# Patient Record
Sex: FEMALE | Race: WHITE | URBAN - METROPOLITAN AREA
[De-identification: names, ages, dates, MRNs, and addresses within clinical notes are randomized per-mention and may not be internally consistent; named-entity substitution may affect disease eponyms.]

---

## 2019-10-21 ENCOUNTER — INPATIENT (INPATIENT)
Facility: HOSPITAL | Age: 84
LOS: 1 days | Discharge: HOME | DRG: 394 | End: 2019-10-23
Attending: HOSPITALIST | Admitting: HOSPITALIST
Payer: MEDICARE

## 2019-10-21 VITALS — WEIGHT: 149.91 LBS | HEIGHT: 60 IN

## 2019-10-21 LAB
ALBUMIN SERPL ELPH-MCNC: 3.4 G/DL — SIGNIFICANT CHANGE UP (ref 3.3–5)
ALP SERPL-CCNC: 101 U/L — SIGNIFICANT CHANGE UP (ref 40–120)
ALT FLD-CCNC: 14 U/L — SIGNIFICANT CHANGE UP (ref 12–78)
ANION GAP SERPL CALC-SCNC: 10 MMOL/L — SIGNIFICANT CHANGE UP (ref 5–17)
APPEARANCE UR: CLEAR — SIGNIFICANT CHANGE UP
APTT BLD: 31.2 SEC — SIGNIFICANT CHANGE UP (ref 27.5–36.3)
AST SERPL-CCNC: 27 U/L — SIGNIFICANT CHANGE UP (ref 15–37)
BASOPHILS # BLD AUTO: 0.02 K/UL — SIGNIFICANT CHANGE UP (ref 0–0.2)
BASOPHILS NFR BLD AUTO: 0.4 % — SIGNIFICANT CHANGE UP (ref 0–2)
BILIRUB SERPL-MCNC: 0.6 MG/DL — SIGNIFICANT CHANGE UP (ref 0.2–1.2)
BILIRUB UR-MCNC: ABNORMAL
BUN SERPL-MCNC: 24 MG/DL — HIGH (ref 7–23)
CALCIUM SERPL-MCNC: 9 MG/DL — SIGNIFICANT CHANGE UP (ref 8.5–10.1)
CHLORIDE SERPL-SCNC: 109 MMOL/L — HIGH (ref 96–108)
CO2 SERPL-SCNC: 23 MMOL/L — SIGNIFICANT CHANGE UP (ref 22–31)
COLOR SPEC: ABNORMAL
CREAT SERPL-MCNC: 0.91 MG/DL — SIGNIFICANT CHANGE UP (ref 0.5–1.3)
DIFF PNL FLD: NEGATIVE — SIGNIFICANT CHANGE UP
EOSINOPHIL # BLD AUTO: 0.11 K/UL — SIGNIFICANT CHANGE UP (ref 0–0.5)
EOSINOPHIL NFR BLD AUTO: 2.3 % — SIGNIFICANT CHANGE UP (ref 0–6)
GLUCOSE SERPL-MCNC: 96 MG/DL — SIGNIFICANT CHANGE UP (ref 70–99)
GLUCOSE UR QL: NEGATIVE MG/DL — SIGNIFICANT CHANGE UP
HCT VFR BLD CALC: 37.8 % — SIGNIFICANT CHANGE UP (ref 34.5–45)
HGB BLD-MCNC: 12.5 G/DL — SIGNIFICANT CHANGE UP (ref 11.5–15.5)
IMM GRANULOCYTES NFR BLD AUTO: 0.2 % — SIGNIFICANT CHANGE UP (ref 0–1.5)
INR BLD: 1.1 RATIO — SIGNIFICANT CHANGE UP (ref 0.88–1.16)
KETONES UR-MCNC: NEGATIVE — SIGNIFICANT CHANGE UP
LACTATE SERPL-SCNC: 3.1 MMOL/L — HIGH (ref 0.7–2)
LEUKOCYTE ESTERASE UR-ACNC: NEGATIVE — SIGNIFICANT CHANGE UP
LIDOCAIN IGE QN: 71 U/L — LOW (ref 73–393)
LYMPHOCYTES # BLD AUTO: 1.48 K/UL — SIGNIFICANT CHANGE UP (ref 1–3.3)
LYMPHOCYTES # BLD AUTO: 30.7 % — SIGNIFICANT CHANGE UP (ref 13–44)
MCHC RBC-ENTMCNC: 33.1 GM/DL — SIGNIFICANT CHANGE UP (ref 32–36)
MCHC RBC-ENTMCNC: 33.4 PG — SIGNIFICANT CHANGE UP (ref 27–34)
MCV RBC AUTO: 101.1 FL — HIGH (ref 80–100)
MONOCYTES # BLD AUTO: 0.63 K/UL — SIGNIFICANT CHANGE UP (ref 0–0.9)
MONOCYTES NFR BLD AUTO: 13.1 % — SIGNIFICANT CHANGE UP (ref 2–14)
NEUTROPHILS # BLD AUTO: 2.57 K/UL — SIGNIFICANT CHANGE UP (ref 1.8–7.4)
NEUTROPHILS NFR BLD AUTO: 53.3 % — SIGNIFICANT CHANGE UP (ref 43–77)
NITRITE UR-MCNC: NEGATIVE — SIGNIFICANT CHANGE UP
PH UR: 5 — SIGNIFICANT CHANGE UP (ref 5–8)
PLATELET # BLD AUTO: 185 K/UL — SIGNIFICANT CHANGE UP (ref 150–400)
POTASSIUM SERPL-MCNC: 4.7 MMOL/L — SIGNIFICANT CHANGE UP (ref 3.5–5.3)
POTASSIUM SERPL-SCNC: 4.7 MMOL/L — SIGNIFICANT CHANGE UP (ref 3.5–5.3)
PROT SERPL-MCNC: 7 GM/DL — SIGNIFICANT CHANGE UP (ref 6–8.3)
PROT UR-MCNC: NEGATIVE MG/DL — SIGNIFICANT CHANGE UP
PROTHROM AB SERPL-ACNC: 12.2 SEC — SIGNIFICANT CHANGE UP (ref 10–12.9)
RBC # BLD: 3.74 M/UL — LOW (ref 3.8–5.2)
RBC # FLD: 13.8 % — SIGNIFICANT CHANGE UP (ref 10.3–14.5)
SODIUM SERPL-SCNC: 142 MMOL/L — SIGNIFICANT CHANGE UP (ref 135–145)
SP GR SPEC: 1.02 — SIGNIFICANT CHANGE UP (ref 1.01–1.02)
TROPONIN I SERPL-MCNC: <0.015 NG/ML — SIGNIFICANT CHANGE UP (ref 0.01–0.04)
UROBILINOGEN FLD QL: 4 MG/DL
WBC # BLD: 4.82 K/UL — SIGNIFICANT CHANGE UP (ref 3.8–10.5)
WBC # FLD AUTO: 4.82 K/UL — SIGNIFICANT CHANGE UP (ref 3.8–10.5)

## 2019-10-21 PROCEDURE — 93010 ELECTROCARDIOGRAM REPORT: CPT

## 2019-10-21 PROCEDURE — 71045 X-RAY EXAM CHEST 1 VIEW: CPT | Mod: 26

## 2019-10-21 PROCEDURE — 74177 CT ABD & PELVIS W/CONTRAST: CPT | Mod: 26

## 2019-10-21 RX ORDER — MORPHINE SULFATE 50 MG/1
4 CAPSULE, EXTENDED RELEASE ORAL ONCE
Refills: 0 | Status: DISCONTINUED | OUTPATIENT
Start: 2019-10-21 | End: 2019-10-21

## 2019-10-21 RX ORDER — SODIUM CHLORIDE 9 MG/ML
1000 INJECTION INTRAMUSCULAR; INTRAVENOUS; SUBCUTANEOUS ONCE
Refills: 0 | Status: COMPLETED | OUTPATIENT
Start: 2019-10-21 | End: 2019-10-21

## 2019-10-21 RX ADMIN — SODIUM CHLORIDE 1000 MILLILITER(S): 9 INJECTION INTRAMUSCULAR; INTRAVENOUS; SUBCUTANEOUS at 21:51

## 2019-10-21 RX ADMIN — MORPHINE SULFATE 4 MILLIGRAM(S): 50 CAPSULE, EXTENDED RELEASE ORAL at 18:52

## 2019-10-21 RX ADMIN — SODIUM CHLORIDE 1000 MILLILITER(S): 9 INJECTION INTRAMUSCULAR; INTRAVENOUS; SUBCUTANEOUS at 18:53

## 2019-10-21 RX ADMIN — SODIUM CHLORIDE 1000 MILLILITER(S): 9 INJECTION INTRAMUSCULAR; INTRAVENOUS; SUBCUTANEOUS at 20:35

## 2019-10-21 RX ADMIN — Medication 0.5 MILLIGRAM(S): at 22:38

## 2019-10-21 RX ADMIN — MORPHINE SULFATE 4 MILLIGRAM(S): 50 CAPSULE, EXTENDED RELEASE ORAL at 19:33

## 2019-10-21 RX ADMIN — Medication 0.5 MILLIGRAM(S): at 19:41

## 2019-10-21 NOTE — ED ADULT TRIAGE NOTE - HEIGHT IN FEET
Patient: Felicia Ellison    Procedure(s):  COMBINED ENDOSCOPIC ULTRASOUND, ESOPHAGOSCOPY, GASTROSCOPY, DUODENOSCOPY (EGD) - Wound Class: II-Clean Contaminated    Diagnosis:PANCREATITIS,PANCREATIC CYST  Diagnosis Additional Information: No value filed.    Anesthesia Type:  MAC    Note:  Anesthesia Post Evaluation    Patient location during evaluation: PACU  Patient participation: Able to fully participate in evaluation  Level of consciousness: awake and alert  Pain management: adequate  Airway patency: patent  Cardiovascular status: acceptable  Respiratory status: acceptable  Hydration status: acceptable  PONV: none     Anesthetic complications: None          Last vitals:  Vitals:    04/11/17 1245 04/11/17 1300 04/11/17 1315   BP: 120/78 116/69 120/84   Resp: 13 16 16   Temp:   36.3  C (97.3  F)   SpO2: 98% 99% 100%         Electronically Signed By: Radha Coates MD  April 11, 2017  3:12 PM   5

## 2019-10-21 NOTE — ED PROVIDER NOTE - OBJECTIVE STATEMENT
Pertinent HPI/PMH/PSH/FHx/SHx and Review of Systems contained within  HPI: 87 yo female p/w CC abd pain.  As per family at bedside pt has history of hernia s/p repair but 1 hour PTA pt began screaming in pain. Sent for possible incarcerated hernia, pt has seen Dr. Fuentes and Dr. Lagunas, states she is not a surgical candidate.    PMH/PSH relevant for: HTN, dementia, hernia, s/p cholecystectomy, and s/p hysterectomy  ROS negative for: fever, Chest pain, SOB, Nausea, vomiting, diarrhea, dysuria    FamilyHx and SocialHx not otherwise contributory Pertinent HPI/PMH/PSH/FHx/SHx and Review of Systems contained within  HPI: 89 yo female p/w CC abd pain.  As per family at bedside pt has history of hernia s/p repair but 1 hour PTA pt began screaming in pain. pt is demented at baseline but has been more agitated than usual. pt has seen surgery Dr. Fuentes and Dr. Lagunas, states she is not a surgical candidate.    PMH/PSH relevant for: HTN, dementia, hernia, s/p cholecystectomy, and s/p hysterectomy    FamilyHx and SocialHx not otherwise contributory

## 2019-10-21 NOTE — ED ADULT NURSE REASSESSMENT NOTE - NS ED NURSE REASSESS COMMENT FT1
Report received from previous RN, patient agitated and yelling.  MD made aware.  Will continue to monitor.

## 2019-10-21 NOTE — ED PROVIDER NOTE - UNABLE TO OBTAIN
Pt is poor hiso Dementia Pt is poor historian due to baseline dementia. Pt is poor historian due to dementia

## 2019-10-21 NOTE — ED PROVIDER NOTE - PSH
Hernia    History of Cholecystectomy    History of Hysterectomy    History of Left Mastectomy    Shoulder Disorder

## 2019-10-21 NOTE — ED PROVIDER NOTE - PHYSICAL EXAMINATION
*GEN: moderate distress, screaming in pain   *HEAD: NC/AT   *EYES/NOSE: PERRL & EOMI b/l  *THROAT: airway patent, moist mucous membranes  *NECK: Neck supple, no masses  *PULMONARY: CTA b/l, symmetric breath sounds.   *CARDIAC: s1s2, regular rhythm, no Murmur  *ABDOMEN:  paraumbilical hernia that is reducible and soft   *BACK: no CVA tenderness, Normal  spine   *EXTREMITIES: symmetric pulses, 2+ dp & radial pulses, capillary refill < 2 seconds, no cyanosis, no edema   *SKIN: no rash or bruising   *NEUROLOGIC: demented, moves all 4 extremities   *PSYCH: demented but at baseline mental status as per family at bedside *GEN: moderate distress, screaming in pain, jumping off stretcher  *HEAD: NC/AT   *EYES/NOSE: PERRL & EOMI b/l  *THROAT: airway patent, moist mucous membranes  *NECK: Neck supple, no masses  *PULMONARY: CTA b/l, symmetric breath sounds.   *CARDIAC: s1s2, regular rhythm, no Murmur  *ABDOMEN:  paraumbilical hernia that is reducible and soft   *BACK: no CVA tenderness, Normal  spine   *EXTREMITIES: symmetric pulses, 2+ dp & radial pulses, capillary refill < 2 seconds, no cyanosis, no edema   *SKIN: no rash or bruising   *NEUROLOGIC: demented, moves all 4 extremities   *PSYCH: demented but at baseline mental status as per family at bedside

## 2019-10-21 NOTE — ED ADULT NURSE NOTE - NSIMPLEMENTINTERV_GEN_ALL_ED
Implemented All Fall with Harm Risk Interventions:  [place] to call system. Call bell, personal items and telephone within reach. Instruct patient to call for assistance. Room bathroom lighting operational. Non-slip footwear when patient is off stretcher. Physically safe environment: no spills, clutter or unnecessary equipment. Stretcher in lowest position, wheels locked, appropriate side rails in place. Provide visual cue, wrist band, yellow gown, etc. Monitor gait and stability. Monitor for mental status changes and reorient to person, place, and time. Review medications for side effects contributing to fall risk. Reinforce activity limits and safety measures with patient and family. Provide visual clues: red socks.

## 2019-10-21 NOTE — ED ADULT NURSE NOTE - CHIEF COMPLAINT QUOTE
Pt has very tender right abdominal hernia, possible incarcerated hernia, pt has seen Dr. morales and Dr. rosas , states she is not a surgical canidate . Pt had hernia repair with mesh . Pt is yelling in triage of severe pain x 1 hr PTA . hx dementia

## 2019-10-21 NOTE — ED ADULT NURSE NOTE - OBJECTIVE STATEMENT
Patient agitated A&Ox1 accompanied by daughter who lives with her.  She has a history of abdominal hernias and is experiencing pain as per daughter.  She is uncooperative with care at this time due to agitation.  Pain meds given with no relief of agitation.  Ativan administered, will continue to monitor.

## 2019-10-21 NOTE — ED ADULT NURSE REASSESSMENT NOTE - NS ED NURSE REASSESS COMMENT FT1
Straight cath under sterile technique performed to obtain urine with 3 assist to ensure sterility due to patients agitation and confusion.

## 2019-10-21 NOTE — ED PROVIDER NOTE - NS ED ROS FT
Review of Systems:  	•	CONSTITUTIONAL: no fever  	  	•	GI:  + abd pain, no nausea, no vomiting, no diarrhea  	  	•	PSYSCHIATRIC: +agitation

## 2019-10-21 NOTE — ED PROVIDER NOTE - PROGRESS NOTE DETAILS
tena: pt wouldn't lay still for ct, will give more ativan tena: ct shows no sbo, non incarcerated hernia but pt in signifcant pain & lactate 3.1 so will consult surgery Rm Reich: pt signed out to next Physician. answered all questions. PENDING: surgery eval & reassessement of pt spoke with Dr. Triana no need for surgical intervention, pt sleeping, daughter at bedside stated pt was in terrible pain. pt not taking po will admit to medicine SUZI Cheney DO

## 2019-10-21 NOTE — ED ADULT NURSE REASSESSMENT NOTE - NS ED NURSE REASSESS COMMENT FT1
Patient agitated while giving Ativan, ripped IV out.  Patient continuously agitated.  Coping techniques encouraged however patient is very confused and not cooperative at this moment.  Daughter at bedside.  Patient too agitated at the moment to straight cath for sample, EKG, or CT scan.  Dr. Reich made aware will continue to monitor.

## 2019-10-21 NOTE — ED PROVIDER NOTE - CLINICAL SUMMARY MEDICAL DECISION MAKING FREE TEXT BOX
Paraumbilical reducible hernia, does not appear incarcerated. Pt is in significant amount of pain. Will get CT to evaluate for incarceration. Paraumbilical reducible hernia, does not appear incarcerated clinically. Pt is in significant amount of pain. Will get CT to evaluate for incarceration.

## 2019-10-22 ENCOUNTER — TRANSCRIPTION ENCOUNTER (OUTPATIENT)
Age: 84
End: 2019-10-22

## 2019-10-22 DIAGNOSIS — K43.9 VENTRAL HERNIA WITHOUT OBSTRUCTION OR GANGRENE: ICD-10-CM

## 2019-10-22 LAB
CULTURE RESULTS: NO GROWTH — SIGNIFICANT CHANGE UP
SPECIMEN SOURCE: SIGNIFICANT CHANGE UP

## 2019-10-22 PROCEDURE — 99223 1ST HOSP IP/OBS HIGH 75: CPT

## 2019-10-22 PROCEDURE — 85025 COMPLETE CBC W/AUTO DIFF WBC: CPT

## 2019-10-22 PROCEDURE — 36415 COLL VENOUS BLD VENIPUNCTURE: CPT

## 2019-10-22 PROCEDURE — 80053 COMPREHEN METABOLIC PANEL: CPT

## 2019-10-22 RX ORDER — OLANZAPINE 15 MG/1
2.5 TABLET, FILM COATED ORAL EVERY 6 HOURS
Refills: 0 | Status: DISCONTINUED | OUTPATIENT
Start: 2019-10-22 | End: 2019-10-23

## 2019-10-22 RX ORDER — ENOXAPARIN SODIUM 100 MG/ML
40 INJECTION SUBCUTANEOUS DAILY
Refills: 0 | Status: DISCONTINUED | OUTPATIENT
Start: 2019-10-22 | End: 2019-10-23

## 2019-10-22 RX ORDER — HALOPERIDOL DECANOATE 100 MG/ML
0.5 INJECTION INTRAMUSCULAR ONCE
Refills: 0 | Status: COMPLETED | OUTPATIENT
Start: 2019-10-22 | End: 2019-10-22

## 2019-10-22 RX ORDER — ONDANSETRON 8 MG/1
4 TABLET, FILM COATED ORAL EVERY 6 HOURS
Refills: 0 | Status: DISCONTINUED | OUTPATIENT
Start: 2019-10-22 | End: 2019-10-23

## 2019-10-22 RX ORDER — ESCITALOPRAM OXALATE 10 MG/1
5 TABLET, FILM COATED ORAL DAILY
Refills: 0 | Status: DISCONTINUED | OUTPATIENT
Start: 2019-10-22 | End: 2019-10-23

## 2019-10-22 RX ORDER — SODIUM CHLORIDE 9 MG/ML
1000 INJECTION INTRAMUSCULAR; INTRAVENOUS; SUBCUTANEOUS
Refills: 0 | Status: DISCONTINUED | OUTPATIENT
Start: 2019-10-22 | End: 2019-10-22

## 2019-10-22 RX ADMIN — ENOXAPARIN SODIUM 40 MILLIGRAM(S): 100 INJECTION SUBCUTANEOUS at 21:23

## 2019-10-22 RX ADMIN — OLANZAPINE 2.5 MILLIGRAM(S): 15 TABLET, FILM COATED ORAL at 21:23

## 2019-10-22 RX ADMIN — HALOPERIDOL DECANOATE 0.5 MILLIGRAM(S): 100 INJECTION INTRAMUSCULAR at 23:47

## 2019-10-22 RX ADMIN — SODIUM CHLORIDE 125 MILLILITER(S): 9 INJECTION INTRAMUSCULAR; INTRAVENOUS; SUBCUTANEOUS at 07:09

## 2019-10-22 RX ADMIN — ESCITALOPRAM OXALATE 5 MILLIGRAM(S): 10 TABLET, FILM COATED ORAL at 20:01

## 2019-10-22 RX ADMIN — Medication 0.5 MILLIGRAM(S): at 23:47

## 2019-10-22 NOTE — H&P ADULT - HISTORY OF PRESENT ILLNESS
89 y/o F PMHx anxiety, dementia, breast CA (s/p L lumpectomy and L mastectomy) presented to ED with severe abdominal pain. Patient disoriented and with dementia, most history obtained from daughter at bedside.  Daughter states patient had just finished her dinner last night when she started screaming in pain. States she had severe abdominal pain which would not improve so was brought to ED for evaluation.  Daughter states patient had similar episode (not as severe) in past, which resolved after a few minutes. As per daughter patient has had no recent fevers, chills, trouble breathing, no sick contacts.    In ED, patient had CT Abd/Pelvis with IV contrast which showed rectus diastasis with small midline ventral pelvic hernia containing unobstructed bowel. No bowel obstruction. Cholecystectomy with intrahepatic and extrahepatic biliary ductal dilatation likely due to postsurgical change, however correlate with serum bilirubin levels to exclude biliary obstruction and to determine if further evaluation with MRCP/ERCP is indicated. Surgery consulted- no acute surgical intervention at this time.

## 2019-10-22 NOTE — PHARMACOTHERAPY INTERVENTION NOTE - COMMENTS
Spoke with patients daughter regarding medications. Also referred to Dr. Perry. Daughter stated she currently does not give the patient any medications. Patient has escitalopram on Dr. Perry but, daughter stated she stopped giving the patient this medication.

## 2019-10-22 NOTE — PROGRESS NOTE ADULT - SUBJECTIVE AND OBJECTIVE BOX
CC:Patient is a 88y old  Female who presents with a chief complaint of abd pain    Subjective:  Pt seen and examined at bedside with chaperone. Pt is awake, alert, oriented, comfortable, cooperative, pt in no acute distress. Pt states improved c/o abd pain since admission. Pt denied c/o fever, chills, chest pain, SOB, N/V/D, extremity pain or dysfunction, hemoptysis, hematemesis, hematuria, hematochexia, headache, diplopia, vertigo, dizzyness. Pt states tolerating diet, (+) void, (+) ambulation, (+) bowel function    ROS:  otherwise as abovementioned ROS    Vital Signs Last 24 Hrs  T(C): 36.7 (22 Oct 2019 09:14), Max: 37.1 (21 Oct 2019 19:02)  T(F): 98 (22 Oct 2019 09:14), Max: 98.7 (21 Oct 2019 19:02)  HR: 86 (22 Oct 2019 09:14) (80 - 108)  BP: 146/72 (22 Oct 2019 09:14) (142/96 - 167/100)  BP(mean): --  RR: 17 (22 Oct 2019 09:14) (15 - 18)  SpO2: 98% (22 Oct 2019 09:14) (95% - 100%)    Labs:      CARDIAC MARKERS ( 21 Oct 2019 18:51 )  <0.015 ng/mL / x     / x     / x     / x                                12.5   4.82  )-----------( 185      ( 21 Oct 2019 18:51 )             37.8     CBC Full  -  ( 21 Oct 2019 18:51 )  WBC Count : 4.82 K/uL  RBC Count : 3.74 M/uL  Hemoglobin : 12.5 g/dL  Hematocrit : 37.8 %  Platelet Count - Automated : 185 K/uL  Mean Cell Volume : 101.1 fl  Mean Cell Hemoglobin : 33.4 pg  Mean Cell Hemoglobin Concentration : 33.1 gm/dL  Auto Neutrophil # : 2.57 K/uL  Auto Lymphocyte # : 1.48 K/uL  Auto Monocyte # : 0.63 K/uL  Auto Eosinophil # : 0.11 K/uL  Auto Basophil # : 0.02 K/uL  Auto Neutrophil % : 53.3 %  Auto Lymphocyte % : 30.7 %  Auto Monocyte % : 13.1 %  Auto Eosinophil % : 2.3 %  Auto Basophil % : 0.4 %    10-21    142  |  109<H>  |  24<H>  ----------------------------<  96  4.7   |  23  |  0.91    Ca    9.0      21 Oct 2019 18:51    TPro  7.0  /  Alb  3.4  /  TBili  0.6  /  DBili  x   /  AST  27  /  ALT  14  /  AlkPhos  101  10-21    LIVER FUNCTIONS - ( 21 Oct 2019 18:51 )  Alb: 3.4 g/dL / Pro: 7.0 gm/dL / ALK PHOS: 101 U/L / ALT: 14 U/L / AST: 27 U/L / GGT: x           PT/INR - ( 21 Oct 2019 18:51 )   PT: 12.2 sec;   INR: 1.10 ratio         PTT - ( 21 Oct 2019 18:51 )  PTT:31.2 sec      Meds:  ondansetron Injectable 4 milliGRAM(s) IV Push every 6 hours PRN  sodium chloride 0.9%. 1000 milliLiter(s) IV Continuous <Continuous>      Radiology:  < from: CT Abdomen and Pelvis w/ IV Cont (10.21.19 @ 23:28) >    EXAM:  CT ABDOMEN AND PELVIS IC                            PROCEDURE DATE:  10/21/2019          INTERPRETATION:  CLINICAL INFORMATION: Abdominal pain. History of hernia.    COMPARISON: None.    TECHNIQUE: Contrast enhanced CT of the abdomen and pelvis was performed   with coronal and sagittal reformats. 90 cc Omnipaque 350 were    intravenously and 10 cc were discarded. No oral contrast. Repeat   acquisition was obtained secondary to patient motion.    FINDINGS:    LOWER CHEST: Partially imaged left chest wall surgical clips.   Subsegmental bibasilar atelectasis. Calcifications of the aorta, aortic   valve and coronary arteries. He megaly.    HEPATOBILIARY: Cholecystectomy with intrahepatic and extrahepatic biliary   ductal dilatation and common bile duct measuring 9 mm, at upper limits of   normal for age.  PANCREAS: Atrophic.  SPLEEN: Within normal limits.  ADRENALS: Within normal limits.    KIDNEYS/URETERS/BLADDER: Contrast excretion into the renal collecting   system and bladder. Bilateral peripelvic renal cysts. No hydronephrosis.  REPRODUCTIVE ORGANS: Hysterectomy. Small amount of free pelvic fluid.    BOWEL/PERITONEUM: Small hiatal hernia. No bowel obstruction or   pneumoperitoneum.  Normal appendix. Portions of the gastrointestinal   tract are collapsed, limiting evaluation.     VESSELS:  Moderate to severe calcific atherosclerosis. No abdominal   aortic aneurysm.  LYMPHATICS/RETROPERITONEUM: No lymphadenopathy. No retroperitoneal   hematoma.      SOFT TISSUES: Rectus diastasis with small midline ventral pelvic hernia   containing unobstructed bowel.  BONES: Partially imaged intramedullary rods in bilateral humeri.   Generalized osteopenia. Spinal degenerative changes.    IMPRESSION:     -Rectus diastasis with small midline ventral pelvic hernia containing   unobstructed bowel. No bowel obstruction.    -Cholecystectomy with intrahepatic and extrahepatic biliary ductal   dilatation likely due to postsurgical change, however correlate with   serum bilirubinlevels to exclude biliary obstruction and to determine if   further evaluation with MRCP/ERCP is indicated.    -Additional incidental findings as described in the body of the report.                          ELIANE JONES   This document has been electronically signed. Oct 22 2019 12:14AM        < end of copied text >      Physical exam:  Pt is aaox3  Pt in no acute distress  Psych: normal affect  Resp: CTAB  CVS: S1S2(+)  ABD: bowel sounds (+), soft, non distended grossly, no rebound, no guarding, no rigidity, no skin changes to exam.  Mild mid abd tenderness to exam to deep palpation. (+) diastasis recti with chronic ventral hernia  EXT: no calf tenderness or edema to exam b/l, on VTE prophylaxis  Skin: no adverse skin changes to exam

## 2019-10-22 NOTE — H&P ADULT - RS GEN PE MLT RESP DETAILS PC
no intercostal retractions/breath sounds equal/no chest wall tenderness/good air movement/no wheezes/no rales/no rhonchi/respirations non-labored/clear to auscultation bilaterally

## 2019-10-22 NOTE — PROGRESS NOTE ADULT - ASSESSMENT
A/P:  Chronic Ventral hernia  Diastasis Recti  Dr Fuentes notified of pt per pt request  Medical management per primary service  No acute surgical intervention   Monitor diet tolerance, bowel function  Cont current care and meds A/P:  Chronic Ventral hernia, no clinical evidence of incarceration/strangulation  No bowel obstruction to radiologic and clinical exams  Diastasis Recti  Dr Fuentes notified of pt per pt request  Medical management per primary service  No acute surgical intervention   Monitor diet tolerance, bowel function  Cont current care and meds

## 2019-10-22 NOTE — H&P ADULT - NSICDXPASTSURGICALHX_GEN_ALL_CORE_FT
PAST SURGICAL HISTORY:  Hernia     History of Cholecystectomy     History of Hysterectomy     History of Left Mastectomy     Shoulder Disorder

## 2019-10-22 NOTE — CHART NOTE - NSCHARTNOTEFT_GEN_A_CORE
88 YOWF with chronic dementia has become agitated and confused. not following instructions and refusing to stay in bed.  She is alert and moving all extremities with good strength and control. She does appear to be delusional and may be hallucinating.  Vital Signs Last 24 Hrs    T(F): 97.8 (22 Oct 2019 20:04), Max: 98.7 (22 Oct 2019 05:22)  HR: 82 (22 Oct 2019 20:04) (82 - 90)  BP: 148/87 (22 Oct 2019 20:04) (146/72 - 160/94)  RR: 17 (22 Oct 2019 20:04) (15 - 17)  SpO2: 94% (22 Oct 2019 20:04) (94% - 98%)     unable to examine as patient is potentially assaultive and spits at staff.  Patients mental status has decompensated due to dementia and unfamiliar environment.  Did not respond to Zyprexa or Lexapro given earlier.  she is at risk of hurting herself in a fall and potentially injuring staff and or other patients.  will give 0.5 mg Haldol and Ativan 0.5 mg IV and place on enhanced supervision.

## 2019-10-22 NOTE — H&P ADULT - ASSESSMENT
#Abdominal pain  #Dementia  #Anxiety    Plan:  -Admit to medicine  -Surgery consult appreciated  -GI consulted  -Zofran prn  -Regular diet  -PT consult  -Lovenox for DVT ppx  -Enhanced supervision  -DNR/DNI- Filled out MOLST with daughter- total time spent discussing advanced care directives: 14 minutes

## 2019-10-22 NOTE — PATIENT PROFILE ADULT - ABILITY TO HEAR (WITH HEARING AID OR HEARING APPLIANCE IF NORMALLY USED):
unable to assess cognition impaired unable to assess cognition impaired/Mildly to Moderately Impaired: difficulty hearing in some environments or speaker may need to increase volume or speak distinctly

## 2019-10-22 NOTE — ED ADULT NURSE REASSESSMENT NOTE - NS ED NURSE REASSESS COMMENT FT1
Patient's daughter at bedside approached RN and said she wants to take her mom home and not be admitted anymore.  She said that "the aide, my  and myself can take care of her there."  MD Cheney made aware.  PO challenge to be done before patient can be discharged.  Gave patient ginger ale and crackers.

## 2019-10-22 NOTE — PATIENT PROFILE ADULT - VISION (WITH CORRECTIVE LENSES IF THE PATIENT USUALLY WEARS THEM):
unable to assess cognition impaired Normal vision: sees adequately in most situations; can see medication labels, newsprint/unable to assess cognition impaired

## 2019-10-22 NOTE — PHYSICAL THERAPY INITIAL EVALUATION ADULT - PERTINENT HX OF CURRENT PROBLEM, REHAB EVAL
c/o abdom pain. pt w/ h/o Chronic Ventral hernia, no clinical evidence of incarceration/strangulation, no bowel obstr on imaging. no sx intervention.

## 2019-10-23 ENCOUNTER — TRANSCRIPTION ENCOUNTER (OUTPATIENT)
Age: 84
End: 2019-10-23

## 2019-10-23 VITALS
HEART RATE: 84 BPM | DIASTOLIC BLOOD PRESSURE: 97 MMHG | RESPIRATION RATE: 18 BRPM | TEMPERATURE: 98 F | OXYGEN SATURATION: 94 % | SYSTOLIC BLOOD PRESSURE: 122 MMHG

## 2019-10-23 LAB
ALBUMIN SERPL ELPH-MCNC: 2.8 G/DL — LOW (ref 3.3–5)
ALP SERPL-CCNC: 127 U/L — HIGH (ref 40–120)
ALT FLD-CCNC: 150 U/L — HIGH (ref 12–78)
ANION GAP SERPL CALC-SCNC: 6 MMOL/L — SIGNIFICANT CHANGE UP (ref 5–17)
AST SERPL-CCNC: 126 U/L — HIGH (ref 15–37)
BASOPHILS # BLD AUTO: 0 K/UL — SIGNIFICANT CHANGE UP (ref 0–0.2)
BASOPHILS NFR BLD AUTO: 0 % — SIGNIFICANT CHANGE UP (ref 0–2)
BILIRUB SERPL-MCNC: 1.6 MG/DL — HIGH (ref 0.2–1.2)
BUN SERPL-MCNC: 16 MG/DL — SIGNIFICANT CHANGE UP (ref 7–23)
CALCIUM SERPL-MCNC: 8.6 MG/DL — SIGNIFICANT CHANGE UP (ref 8.5–10.1)
CHLORIDE SERPL-SCNC: 110 MMOL/L — HIGH (ref 96–108)
CO2 SERPL-SCNC: 28 MMOL/L — SIGNIFICANT CHANGE UP (ref 22–31)
CREAT SERPL-MCNC: 0.64 MG/DL — SIGNIFICANT CHANGE UP (ref 0.5–1.3)
EOSINOPHIL # BLD AUTO: 0 K/UL — SIGNIFICANT CHANGE UP (ref 0–0.5)
EOSINOPHIL NFR BLD AUTO: 0 % — SIGNIFICANT CHANGE UP (ref 0–6)
GLUCOSE SERPL-MCNC: 73 MG/DL — SIGNIFICANT CHANGE UP (ref 70–99)
HCT VFR BLD CALC: 35.4 % — SIGNIFICANT CHANGE UP (ref 34.5–45)
HGB BLD-MCNC: 11.7 G/DL — SIGNIFICANT CHANGE UP (ref 11.5–15.5)
LYMPHOCYTES # BLD AUTO: 0.52 K/UL — LOW (ref 1–3.3)
LYMPHOCYTES # BLD AUTO: 12 % — LOW (ref 13–44)
MCHC RBC-ENTMCNC: 33.1 GM/DL — SIGNIFICANT CHANGE UP (ref 32–36)
MCHC RBC-ENTMCNC: 33.7 PG — SIGNIFICANT CHANGE UP (ref 27–34)
MCV RBC AUTO: 102 FL — HIGH (ref 80–100)
MONOCYTES # BLD AUTO: 0.26 K/UL — SIGNIFICANT CHANGE UP (ref 0–0.9)
MONOCYTES NFR BLD AUTO: 6 % — SIGNIFICANT CHANGE UP (ref 2–14)
NEUTROPHILS # BLD AUTO: 3.53 K/UL — SIGNIFICANT CHANGE UP (ref 1.8–7.4)
NEUTROPHILS NFR BLD AUTO: 82 % — HIGH (ref 43–77)
NRBC # BLD: SIGNIFICANT CHANGE UP /100 WBCS (ref 0–0)
PLATELET # BLD AUTO: 138 K/UL — LOW (ref 150–400)
POTASSIUM SERPL-MCNC: 3.4 MMOL/L — LOW (ref 3.5–5.3)
POTASSIUM SERPL-SCNC: 3.4 MMOL/L — LOW (ref 3.5–5.3)
PROT SERPL-MCNC: 5.9 GM/DL — LOW (ref 6–8.3)
RBC # BLD: 3.47 M/UL — LOW (ref 3.8–5.2)
RBC # FLD: 13.8 % — SIGNIFICANT CHANGE UP (ref 10.3–14.5)
SODIUM SERPL-SCNC: 144 MMOL/L — SIGNIFICANT CHANGE UP (ref 135–145)
WBC # BLD: 4.3 K/UL — SIGNIFICANT CHANGE UP (ref 3.8–10.5)
WBC # FLD AUTO: 4.3 K/UL — SIGNIFICANT CHANGE UP (ref 3.8–10.5)

## 2019-10-23 RX ORDER — SODIUM CHLORIDE 9 MG/ML
1000 INJECTION INTRAMUSCULAR; INTRAVENOUS; SUBCUTANEOUS ONCE
Refills: 0 | Status: DISCONTINUED | OUTPATIENT
Start: 2019-10-23 | End: 2019-10-23

## 2019-10-23 RX ORDER — POTASSIUM CHLORIDE 20 MEQ
40 PACKET (EA) ORAL ONCE
Refills: 0 | Status: COMPLETED | OUTPATIENT
Start: 2019-10-23 | End: 2019-10-23

## 2019-10-23 RX ADMIN — ESCITALOPRAM OXALATE 5 MILLIGRAM(S): 10 TABLET, FILM COATED ORAL at 12:47

## 2019-10-23 RX ADMIN — OLANZAPINE 2.5 MILLIGRAM(S): 15 TABLET, FILM COATED ORAL at 05:37

## 2019-10-23 RX ADMIN — Medication 40 MILLIEQUIVALENT(S): at 12:47

## 2019-10-23 NOTE — DISCHARGE NOTE PROVIDER - NSDCCPCAREPLAN_GEN_ALL_CORE_FT
PRINCIPAL DISCHARGE DIAGNOSIS  Diagnosis: Ventral hernia  Assessment and Plan of Treatment: -Not incarcerated  -If severe abdominal recurs, recommend contacting PCP or coming to ED  -Follow up with PCP within 2 weeks of discharge

## 2019-10-23 NOTE — CONSULT NOTE ADULT - SUBJECTIVE AND OBJECTIVE BOX
Pt seen and examined at bedside with daughter present. Pt is awake, alert, oriented, comfortable, cooperative, pt in no acute distress. Daughter and patient states that her abdominal pain has resolved and she is eating well. She denies any nausea or vomiting or diarrhea. Daughter states that feels well enough ot go home today possibly.    Vital Signs Last 24 Hrs  T(C): 36.9 (23 Oct 2019 04:45), Max: 36.9 (23 Oct 2019 04:45)  T(F): 98.5 (23 Oct 2019 04:45), Max: 98.5 (23 Oct 2019 04:45)  HR: 84 (23 Oct 2019 04:45) (82 - 98)  BP: 122/97 (23 Oct 2019 04:45) (122/97 - 148/87)  BP(mean): --  RR: 18 (23 Oct 2019 04:45) (17 - 18)  SpO2: 94% (23 Oct 2019 04:45) (94% - 98%)      Pt is aaox3  Pt in no acute distress  Psych: normal affect  Resp: CTAB  CVS: S1S2(+)  ABD: bowel sounds (+), soft, non distended grossly, no rebound, no guarding, no rigidity, no skin changes to exam.  Mild mid abd tenderness to exam to deep palpation. (+) diastasis recti with chronic ventral hernia  EXT: no calf tenderness or edema to exam b/l, on VTE prophylaxis  Skin: no adverse skin changes to exam      Complete Blood Count + Automated Diff (10.23.19 @ 07:25)    WBC Count: 4.30 K/uL    RBC Count: 3.47 M/uL    Hemoglobin: 11.7 g/dL    Hematocrit: 35.4 %    Mean Cell Volume: 102.0 fl    Mean Cell Hemoglobin: 33.7 pg    Mean Cell Hemoglobin Conc: 33.1 gm/dL    Red Cell Distrib Width: 13.8 %    Platelet Count - Automated: 138 K/uL    Radiology:  < from: CT Abdomen and Pelvis w/ IV Cont (10.21.19 @ 23:28) >    EXAM:  CT ABDOMEN AND PELVIS IC                            PROCEDURE DATE:  10/21/2019          INTERPRETATION:  CLINICAL INFORMATION: Abdominal pain. History of hernia.    COMPARISON: None.    TECHNIQUE: Contrast enhanced CT of the abdomen and pelvis was performed   with coronal and sagittal reformats. 90 cc Omnipaque 350 were    intravenously and 10 cc were discarded. No oral contrast. Repeat   acquisition was obtained secondary to patient motion.    FINDINGS:    LOWER CHEST: Partially imaged left chest wall surgical clips.   Subsegmental bibasilar atelectasis. Calcifications of the aorta, aortic   valve and coronary arteries. He megaly.    HEPATOBILIARY: Cholecystectomy with intrahepatic and extrahepatic biliary   ductal dilatation and common bile duct measuring 9 mm, at upper limits of   normal for age.  PANCREAS: Atrophic.  SPLEEN: Within normal limits.  ADRENALS: Within normal limits.    KIDNEYS/URETERS/BLADDER: Contrast excretion into the renal collecting   system and bladder. Bilateral peripelvic renal cysts. No hydronephrosis.  REPRODUCTIVE ORGANS: Hysterectomy. Small amount of free pelvic fluid.    BOWEL/PERITONEUM: Small hiatal hernia. No bowel obstruction or   pneumoperitoneum.  Normal appendix. Portions of the gastrointestinal   tract are collapsed, limiting evaluation.     VESSELS:  Moderate to severe calcific atherosclerosis. No abdominal   aortic aneurysm.  LYMPHATICS/RETROPERITONEUM: No lymphadenopathy. No retroperitoneal   hematoma.      SOFT TISSUES: Rectus diastasis with small midline ventral pelvic hernia   containing unobstructed bowel.  BONES: Partially imaged intramedullary rods in bilateral humeri.   Generalized osteopenia. Spinal degenerative changes.    IMPRESSION:     -Rectus diastasis with small midline ventral pelvic hernia containing   unobstructed bowel. No bowel obstruction.    -Cholecystectomy with intrahepatic and extrahepatic biliary ductal   dilatation likely due to postsurgical change, however correlate with   serum bilirubinlevels to exclude biliary obstruction and to determine if   further evaluation with MRCP/ERCP is indicated.    -Additional incidental findings as described in the body of the report.                  ELIANE JONES   This document has been electronically signed. Oct 22 2019 12:14AM        < end of copied text >
Patient is a 88y old  Female who presents with a chief complaint of   HPI:  88 y old female with multiple medical problems, known abdominal hernia, presented with sudden  onset of abdominal pain an hours after eating, pain was sever, is now better, no radiation, no nausea no vomiting She has been having loose BMs for few days. No fever. Last BM was this morning. no sick contacts. Her last repair was with , she has been told by several surgeons she is not a surgical candidate  ROS:.  [] A ten-point review of systems was otherwise negative except as noted.  Systemic:	[ ] Fever	[ ] Chills	[ ] Night sweats    [ ] Fatigue	[ ] Other  [] Cardiovascular:  [] Pulmonary:  [] Renal/Urologic:  [] Gastrointestinal: abdominal pain, vomiting  [] Metabolic:  [] Neurologic:  [] Hematologic:  [] ENT:  [] Ophthalmologic:  [] Musculoskeletal:    [ X] Due to altered mental status/intubation, subjective information were not able to be obtained from the patient. History was obtained, to the extent possible, from review of the chart and collateral sources of information.( sleepy, daughter helped with information    PAST MEDICAL & SURGICAL HISTORY:  Dementia  HTN (Hypertension)  History of Left Mastectomy  Shoulder Disorder  History of Hysterectomy  History of Cholecystectomy  Hernia    FAMILY HISTORY:    Social History:    Alcohol: Denied  Smoking: Denied  Drug Use: Denied        Allergies    No Known Allergies    Intolerances      MEDICATIONS  (STANDING):    Vital Signs Last 24 Hrs  T(C): 37.1 (21 Oct 2019 22:26), Max: 37.1 (21 Oct 2019 19:02)  T(F): 98.7 (21 Oct 2019 22:26), Max: 98.7 (21 Oct 2019 19:02)  HR: 80 (21 Oct 2019 22:26) (80 - 108)  BP: 167/100 (21 Oct 2019 22:26) (142/96 - 167/100)  BP(mean): --  RR: 17 (21 Oct 2019 22:26) (17 - 18)  SpO2: 98% (21 Oct 2019 22:26) (98% - 100%)  PHYSICAL EXAM:  Constitutional: NAD, GCS: 15/15  AOX2  Eyes:  WNL  ENMT:  WNL  Neck:  WNL, non tender  Back: Non tender  Respiratory: CTABL  Cardiovascular:  S1+S2+0  Gastrointestinal: Soft, ND, minimal tender in mid abdomen, partially reducible ventral hernia to left of mid line, diastasis, loss of domain.  Genitourinary:  WNL  Extremities: NV intact  Vascular:  Intact  Neurological: No focal neurological deficit,  CN, motor and sensory system grossly intact.  Skin: WNL  Musculoskeletal: WNL  Psychiatric: Grossly WNL      Labs:                          12.5   4.82  )-----------( 185      ( 21 Oct 2019 18:51 )             37.8       10    142  |  109<H>  |  24<H>  ----------------------------<  96  4.7   |  23  |  0.91    Ca    9.0      21 Oct 2019 18:51    TPro  7.0  /  Alb  3.4  /  TBili  0.6  /  DBili  x   /  AST  27  /  ALT  14  /  AlkPhos  101  10-21      PT/INR - ( 21 Oct 2019 18:51 )   PT: 12.2 sec;   INR: 1.10 ratio         PTT - ( 21 Oct 2019 18:51 )  PTT:31.2 sec  Urinalysis Basic - ( 21 Oct 2019 20:31 )    Color: Suly / Appearance: Clear / S.020 / pH: x  Gluc: x / Ketone: Negative  / Bili: Small / Urobili: 4 mg/dL   Blood: x / Protein: Negative mg/dL / Nitrite: Negative   Leuk Esterase: Negative / RBC: 6-10 /HPF / WBC 3-5   Sq Epi: x / Non Sq Epi: Few / Bacteria: Few  Lactate, Blood: 1.2 mmol/L (10.22.19 @ 00:46)        Radiology Results:    < from: CT Abdomen and Pelvis w/ IV Cont (10.21.19 @ 23:28) >  SOFT TISSUES: Rectus diastasis with small midline ventral pelvic hernia   containing unobstructed bowel.  BONES: Partially imaged intramedullary rods in bilateral humeri.   Generalized osteopenia. Spinal degenerative changes.    IMPRESSION:     -Rectus diastasis with small midline ventral pelvic hernia containing   unobstructed bowel. No bowel obstruction.    -Cholecystectomy with intrahepatic and extrahepatic biliary ductal   dilatation likely due to postsurgical change, however correlate with   serum bilirubinlevels to exclude biliary obstruction and to determine if   further evaluation with MRCP/ERCP is indicated.    -Additional incidental findings as described in the body of the report.      < end of copied text >

## 2019-10-23 NOTE — DISCHARGE NOTE PROVIDER - HOSPITAL COURSE
89 y/o F PMHx anxiety, dementia, breast CA (s/p L lumpectomy and L mastectomy) presented to ED with severe abdominal pain. Patient disoriented and with dementia, most history obtained from daughter at bedside.  Daughter states patient had just finished her dinner last night when she started screaming in pain. States she had severe abdominal pain which would not improve so was brought to ED for evaluation.  Daughter states patient had similar episode (not as severe) in past, which resolved after a few minutes. As per daughter patient has had no recent fevers, chills, trouble breathing, no sick contacts.        In ED, patient had CT Abd/Pelvis with IV contrast which showed rectus diastasis with small midline ventral pelvic hernia containing unobstructed bowel. No bowel obstruction. Cholecystectomy with intrahepatic and extrahepatic biliary ductal dilatation likely due to postsurgical change, however correlate with serum bilirubin levels to exclude biliary obstruction and to determine if further evaluation with MRCP/ERCP is indicated. Surgery consulted- no acute surgical intervention at this time.        10/23/19- Patient seen and examined at bedside. Was agitated overnight requiring haldol and ativan. Seen with daughter at bedside this morning, looks calm, NAD. Daughter stating she would like to take mother home. Surgery recommending no acute surgical intervention. Daughter does not want to pursue further GI workup at this time as patient has been without abdominal pain since admission.        Physical Exam:    General: Well developed, well nourished, NAD    HEENT: NCAT, PERRL, EOMI bl, moist mucous membranes     Neurology: A&Ox1, nonfocal, CN II-XII grossly intact, sensation intact     Respiratory: CTA B/L, No W/R/R    CV: RRR, +S1/S2, no murmurs, rubs or gallops    Abdominal: Soft, NT, ND +BSx4    Extremities: No C/C/E, + peripheral pulses    Skin: warm, dry        #Abdominal pain    #Dementia    #Anxiety        Plan:    -Surgery consult appreciated- no acute surgical intervention    -GI consulted- daughter refusing further GI workup at this time.    -Regular diet- tolerating well    -PT consult    -DNR/DNI- Filled out MOLST with daughter        Total time spent on discharge including coordination of care: 43 minutes

## 2019-10-23 NOTE — DISCHARGE NOTE PROVIDER - CARE PROVIDER_API CALL
Kelton Carrasco)  Internal Medicine  [address] [place] [address]  Phone: [phone]  Fax: [phone]  Follow Up Time: 2 weeks

## 2019-10-23 NOTE — CONSULT NOTE ADULT - ASSESSMENT
88 year old female with chronic ventral hernia      Chronic Ventral hernia, no clinical evidence of incarceration/strangulation  No bowel obstruction to radiologic and clinical exams  Diastasis Recti  Medical management per primary service  No acute surgical intervention   Monitor diet tolerance, bowel function  Cont current care and meds
88 Y old female with known hernia, no SBO, no incarceration had sever abdominal pain, elevated lactate  ,now WNL     Serial exam  Iv hydration   Possible gastritis eneteritis  hernia is not causing SBO   Will let  know in am  No acute surgical intervention   D/W daughter

## 2019-10-23 NOTE — DISCHARGE NOTE NURSING/CASE MANAGEMENT/SOCIAL WORK - HAS THE PATIENT USED TOBACCO IN THE PAST 30 DAYS?
Unable to assess due to patient's cognitive impairment
Unable to assess due to patient's cognitive impairment

## 2019-10-23 NOTE — DISCHARGE NOTE NURSING/CASE MANAGEMENT/SOCIAL WORK - PATIENT PORTAL LINK FT
You can access the FollowMyHealth Patient Portal offered by Health system by registering at the following website: [email or web address]. By joining [facility]’s FollowMyHealth portal, you will also be able to view your health information using other applications (apps) compatible with our system.
You can access the FollowMyHealth Patient Portal offered by [facility] by registering at the following website: [email or web address]. By joining [employer]’s FollowMyHealth portal, you will also be able to view your health information using other applications (apps) compatible with our system.

## 2019-11-07 DIAGNOSIS — Z90.710 ACQUIRED ABSENCE OF BOTH CERVIX AND UTERUS: ICD-10-CM

## 2019-11-07 DIAGNOSIS — Z90.49 ACQUIRED ABSENCE OF OTHER SPECIFIED PARTS OF DIGESTIVE TRACT: ICD-10-CM

## 2019-11-07 DIAGNOSIS — I44.0 ATRIOVENTRICULAR BLOCK, FIRST DEGREE: ICD-10-CM

## 2019-11-07 DIAGNOSIS — Z85.3 PERSONAL HISTORY OF MALIGNANT NEOPLASM OF BREAST: ICD-10-CM

## 2019-11-07 DIAGNOSIS — I10 ESSENTIAL (PRIMARY) HYPERTENSION: ICD-10-CM

## 2019-11-07 DIAGNOSIS — M62.08 SEPARATION OF MUSCLE (NONTRAUMATIC), OTHER SITE: ICD-10-CM

## 2019-11-07 DIAGNOSIS — Z90.12 ACQUIRED ABSENCE OF LEFT BREAST AND NIPPLE: ICD-10-CM

## 2019-11-07 DIAGNOSIS — F03.91 UNSPECIFIED DEMENTIA WITH BEHAVIORAL DISTURBANCE: ICD-10-CM

## 2019-11-07 DIAGNOSIS — K43.9 VENTRAL HERNIA WITHOUT OBSTRUCTION OR GANGRENE: ICD-10-CM

## 2019-11-07 DIAGNOSIS — Z66 DO NOT RESUSCITATE: ICD-10-CM

## 2019-11-07 DIAGNOSIS — R10.9 UNSPECIFIED ABDOMINAL PAIN: ICD-10-CM

## 2019-11-07 DIAGNOSIS — Z79.899 OTHER LONG TERM (CURRENT) DRUG THERAPY: ICD-10-CM

## 2019-11-07 DIAGNOSIS — F41.9 ANXIETY DISORDER, UNSPECIFIED: ICD-10-CM

## 2021-06-24 ENCOUNTER — INPATIENT (INPATIENT)
Facility: HOSPITAL | Age: 86
LOS: 5 days | Discharge: NURSING, REHAB OR LONG-TERM CARE | DRG: 388 | End: 2021-06-30
Attending: INTERNAL MEDICINE | Admitting: INTERNAL MEDICINE
Payer: MEDICARE

## 2021-06-24 VITALS
OXYGEN SATURATION: 99 % | RESPIRATION RATE: 16 BRPM | HEIGHT: 60 IN | HEART RATE: 89 BPM | WEIGHT: 100.09 LBS | TEMPERATURE: 98 F | SYSTOLIC BLOOD PRESSURE: 138 MMHG | DIASTOLIC BLOOD PRESSURE: 90 MMHG

## 2021-06-24 DIAGNOSIS — K92.2 GASTROINTESTINAL HEMORRHAGE, UNSPECIFIED: ICD-10-CM

## 2021-06-24 DIAGNOSIS — Z96.659 PRESENCE OF UNSPECIFIED ARTIFICIAL KNEE JOINT: Chronic | ICD-10-CM

## 2021-06-24 PROBLEM — F03.90 UNSPECIFIED DEMENTIA WITHOUT BEHAVIORAL DISTURBANCE: Chronic | Status: ACTIVE | Noted: 2019-10-22

## 2021-06-24 LAB
ALBUMIN SERPL ELPH-MCNC: 3.4 G/DL — SIGNIFICANT CHANGE UP (ref 3.3–5)
ALP SERPL-CCNC: 83 U/L — SIGNIFICANT CHANGE UP (ref 40–120)
ALT FLD-CCNC: 29 U/L — SIGNIFICANT CHANGE UP (ref 12–78)
ANION GAP SERPL CALC-SCNC: 5 MMOL/L — SIGNIFICANT CHANGE UP (ref 5–17)
AST SERPL-CCNC: 32 U/L — SIGNIFICANT CHANGE UP (ref 15–37)
BASOPHILS # BLD AUTO: 0.01 K/UL — SIGNIFICANT CHANGE UP (ref 0–0.2)
BASOPHILS NFR BLD AUTO: 0.1 % — SIGNIFICANT CHANGE UP (ref 0–2)
BILIRUB SERPL-MCNC: 1.1 MG/DL — SIGNIFICANT CHANGE UP (ref 0.2–1.2)
BUN SERPL-MCNC: 23 MG/DL — SIGNIFICANT CHANGE UP (ref 7–23)
CALCIUM SERPL-MCNC: 9.1 MG/DL — SIGNIFICANT CHANGE UP (ref 8.5–10.1)
CHLORIDE SERPL-SCNC: 107 MMOL/L — SIGNIFICANT CHANGE UP (ref 96–108)
CO2 SERPL-SCNC: 28 MMOL/L — SIGNIFICANT CHANGE UP (ref 22–31)
CREAT SERPL-MCNC: 0.69 MG/DL — SIGNIFICANT CHANGE UP (ref 0.5–1.3)
EOSINOPHIL # BLD AUTO: 0 K/UL — SIGNIFICANT CHANGE UP (ref 0–0.5)
EOSINOPHIL NFR BLD AUTO: 0 % — SIGNIFICANT CHANGE UP (ref 0–6)
GLUCOSE SERPL-MCNC: 136 MG/DL — HIGH (ref 70–99)
HCT VFR BLD CALC: 36.4 % — SIGNIFICANT CHANGE UP (ref 34.5–45)
HCT VFR BLD CALC: 39.9 % — SIGNIFICANT CHANGE UP (ref 34.5–45)
HGB BLD-MCNC: 12.3 G/DL — SIGNIFICANT CHANGE UP (ref 11.5–15.5)
HGB BLD-MCNC: 13.9 G/DL — SIGNIFICANT CHANGE UP (ref 11.5–15.5)
IMM GRANULOCYTES NFR BLD AUTO: 0.3 % — SIGNIFICANT CHANGE UP (ref 0–1.5)
LACTATE SERPL-SCNC: 1.9 MMOL/L — SIGNIFICANT CHANGE UP (ref 0.7–2)
LIDOCAIN IGE QN: 40 U/L — LOW (ref 73–393)
LYMPHOCYTES # BLD AUTO: 0.47 K/UL — LOW (ref 1–3.3)
LYMPHOCYTES # BLD AUTO: 3.9 % — LOW (ref 13–44)
MCHC RBC-ENTMCNC: 33.8 GM/DL — SIGNIFICANT CHANGE UP (ref 32–36)
MCHC RBC-ENTMCNC: 34.8 GM/DL — SIGNIFICANT CHANGE UP (ref 32–36)
MCHC RBC-ENTMCNC: 35 PG — HIGH (ref 27–34)
MCHC RBC-ENTMCNC: 35.7 PG — HIGH (ref 27–34)
MCV RBC AUTO: 102.6 FL — HIGH (ref 80–100)
MCV RBC AUTO: 103.7 FL — HIGH (ref 80–100)
MONOCYTES # BLD AUTO: 0.51 K/UL — SIGNIFICANT CHANGE UP (ref 0–0.9)
MONOCYTES NFR BLD AUTO: 4.2 % — SIGNIFICANT CHANGE UP (ref 2–14)
NEUTROPHILS # BLD AUTO: 11.08 K/UL — HIGH (ref 1.8–7.4)
NEUTROPHILS NFR BLD AUTO: 91.5 % — HIGH (ref 43–77)
PLATELET # BLD AUTO: 141 K/UL — LOW (ref 150–400)
PLATELET # BLD AUTO: 158 K/UL — SIGNIFICANT CHANGE UP (ref 150–400)
POTASSIUM SERPL-MCNC: 4 MMOL/L — SIGNIFICANT CHANGE UP (ref 3.5–5.3)
POTASSIUM SERPL-SCNC: 4 MMOL/L — SIGNIFICANT CHANGE UP (ref 3.5–5.3)
PROT SERPL-MCNC: 7.1 GM/DL — SIGNIFICANT CHANGE UP (ref 6–8.3)
RBC # BLD: 3.51 M/UL — LOW (ref 3.8–5.2)
RBC # BLD: 3.89 M/UL — SIGNIFICANT CHANGE UP (ref 3.8–5.2)
RBC # FLD: 13.4 % — SIGNIFICANT CHANGE UP (ref 10.3–14.5)
RBC # FLD: 13.5 % — SIGNIFICANT CHANGE UP (ref 10.3–14.5)
SARS-COV-2 RNA SPEC QL NAA+PROBE: SIGNIFICANT CHANGE UP
SODIUM SERPL-SCNC: 140 MMOL/L — SIGNIFICANT CHANGE UP (ref 135–145)
WBC # BLD: 12.11 K/UL — HIGH (ref 3.8–10.5)
WBC # BLD: 8.69 K/UL — SIGNIFICANT CHANGE UP (ref 3.8–10.5)
WBC # FLD AUTO: 12.11 K/UL — HIGH (ref 3.8–10.5)
WBC # FLD AUTO: 8.69 K/UL — SIGNIFICANT CHANGE UP (ref 3.8–10.5)

## 2021-06-24 PROCEDURE — 87040 BLOOD CULTURE FOR BACTERIA: CPT

## 2021-06-24 PROCEDURE — 93010 ELECTROCARDIOGRAM REPORT: CPT

## 2021-06-24 PROCEDURE — 83735 ASSAY OF MAGNESIUM: CPT

## 2021-06-24 PROCEDURE — 74018 RADEX ABDOMEN 1 VIEW: CPT | Mod: 26

## 2021-06-24 PROCEDURE — 73030 X-RAY EXAM OF SHOULDER: CPT | Mod: RT

## 2021-06-24 PROCEDURE — 86769 SARS-COV-2 COVID-19 ANTIBODY: CPT

## 2021-06-24 PROCEDURE — C9113: CPT

## 2021-06-24 PROCEDURE — 99223 1ST HOSP IP/OBS HIGH 75: CPT

## 2021-06-24 PROCEDURE — 71045 X-RAY EXAM CHEST 1 VIEW: CPT | Mod: 26

## 2021-06-24 PROCEDURE — 92507 TX SP LANG VOICE COMM INDIV: CPT | Mod: GN

## 2021-06-24 PROCEDURE — 74019 RADEX ABDOMEN 2 VIEWS: CPT

## 2021-06-24 PROCEDURE — 97162 PT EVAL MOD COMPLEX 30 MIN: CPT | Mod: GP

## 2021-06-24 PROCEDURE — 93971 EXTREMITY STUDY: CPT | Mod: RT

## 2021-06-24 PROCEDURE — 92526 ORAL FUNCTION THERAPY: CPT | Mod: GN

## 2021-06-24 PROCEDURE — 87635 SARS-COV-2 COVID-19 AMP PRB: CPT

## 2021-06-24 PROCEDURE — 71045 X-RAY EXAM CHEST 1 VIEW: CPT

## 2021-06-24 PROCEDURE — 73080 X-RAY EXAM OF ELBOW: CPT | Mod: RT

## 2021-06-24 PROCEDURE — 85027 COMPLETE CBC AUTOMATED: CPT

## 2021-06-24 PROCEDURE — 84100 ASSAY OF PHOSPHORUS: CPT

## 2021-06-24 PROCEDURE — U0005: CPT

## 2021-06-24 PROCEDURE — 74018 RADEX ABDOMEN 1 VIEW: CPT

## 2021-06-24 PROCEDURE — 80048 BASIC METABOLIC PNL TOTAL CA: CPT

## 2021-06-24 PROCEDURE — 74177 CT ABD & PELVIS W/CONTRAST: CPT | Mod: 26

## 2021-06-24 PROCEDURE — 99285 EMERGENCY DEPT VISIT HI MDM: CPT | Mod: 25

## 2021-06-24 PROCEDURE — 36415 COLL VENOUS BLD VENIPUNCTURE: CPT

## 2021-06-24 PROCEDURE — 92610 EVALUATE SWALLOWING FUNCTION: CPT | Mod: GN

## 2021-06-24 PROCEDURE — 92523 SPEECH SOUND LANG COMPREHEN: CPT | Mod: GN

## 2021-06-24 PROCEDURE — U0003: CPT

## 2021-06-24 PROCEDURE — 83605 ASSAY OF LACTIC ACID: CPT

## 2021-06-24 PROCEDURE — 85049 AUTOMATED PLATELET COUNT: CPT

## 2021-06-24 PROCEDURE — 74177 CT ABD & PELVIS W/CONTRAST: CPT

## 2021-06-24 PROCEDURE — 99285 EMERGENCY DEPT VISIT HI MDM: CPT

## 2021-06-24 RX ORDER — ONDANSETRON 8 MG/1
4 TABLET, FILM COATED ORAL ONCE
Refills: 0 | Status: COMPLETED | OUTPATIENT
Start: 2021-06-24 | End: 2021-06-24

## 2021-06-24 RX ORDER — PANTOPRAZOLE SODIUM 20 MG/1
40 TABLET, DELAYED RELEASE ORAL DAILY
Refills: 0 | Status: DISCONTINUED | OUTPATIENT
Start: 2021-06-24 | End: 2021-06-30

## 2021-06-24 RX ORDER — MORPHINE SULFATE 50 MG/1
3 CAPSULE, EXTENDED RELEASE ORAL
Refills: 0 | Status: DISCONTINUED | OUTPATIENT
Start: 2021-06-24 | End: 2021-06-30

## 2021-06-24 RX ORDER — SODIUM CHLORIDE 9 MG/ML
1000 INJECTION, SOLUTION INTRAVENOUS
Refills: 0 | Status: DISCONTINUED | OUTPATIENT
Start: 2021-06-24 | End: 2021-06-28

## 2021-06-24 RX ORDER — SODIUM CHLORIDE 9 MG/ML
1000 INJECTION INTRAMUSCULAR; INTRAVENOUS; SUBCUTANEOUS ONCE
Refills: 0 | Status: COMPLETED | OUTPATIENT
Start: 2021-06-24 | End: 2021-06-24

## 2021-06-24 RX ORDER — ESCITALOPRAM OXALATE 10 MG/1
1 TABLET, FILM COATED ORAL
Qty: 0 | Refills: 0 | DISCHARGE

## 2021-06-24 RX ORDER — ACETAMINOPHEN 500 MG
650 TABLET ORAL EVERY 6 HOURS
Refills: 0 | Status: DISCONTINUED | OUTPATIENT
Start: 2021-06-24 | End: 2021-06-30

## 2021-06-24 RX ORDER — ONDANSETRON 8 MG/1
4 TABLET, FILM COATED ORAL EVERY 6 HOURS
Refills: 0 | Status: DISCONTINUED | OUTPATIENT
Start: 2021-06-24 | End: 2021-06-30

## 2021-06-24 RX ADMIN — PANTOPRAZOLE SODIUM 40 MILLIGRAM(S): 20 TABLET, DELAYED RELEASE ORAL at 17:19

## 2021-06-24 RX ADMIN — Medication 650 MILLIGRAM(S): at 22:40

## 2021-06-24 RX ADMIN — SODIUM CHLORIDE 100 MILLILITER(S): 9 INJECTION, SOLUTION INTRAVENOUS at 18:08

## 2021-06-24 RX ADMIN — SODIUM CHLORIDE 1000 MILLILITER(S): 9 INJECTION INTRAMUSCULAR; INTRAVENOUS; SUBCUTANEOUS at 13:22

## 2021-06-24 RX ADMIN — ONDANSETRON 4 MILLIGRAM(S): 8 TABLET, FILM COATED ORAL at 13:55

## 2021-06-24 NOTE — H&P ADULT - HISTORY OF PRESENT ILLNESS
89 y/o F with PMHx of severe  dementia, breast CA s/p L mastectomy, HTN, OA, s/p TKR, s/p hysterectomy, s/p hernia repair, and s/p cholecystectomy presents to the ED BIBEMS from [facility] for eval of +vomiting today. Staff reports emesis was black in color. Daughter at bedside notes pt was screaming in pain throughout the day yesterday from +abd pain. No fever. NKDA. Pt is poor historian 2/2 baseline severe dementia: non verbal, not recognizing family members. Daughter present DNR, due to the pain I explained options and she decided GI should be consulted if procedure will alleviate Mum's pain. Pt is uncomfortable, in pain.

## 2021-06-24 NOTE — ED PROVIDER NOTE - PSH
Hernia    History of Cholecystectomy    History of Hysterectomy    History of Left Mastectomy    S/P TKR (total knee replacement)    Shoulder Disorder

## 2021-06-24 NOTE — H&P ADULT - NSHPPHYSICALEXAM_GEN_ALL_CORE
Vital Signs Last 24 Hrs  T(C): 36.6 (24 Jun 2021 15:15), Max: 36.9 (24 Jun 2021 12:22)  T(F): 97.8 (24 Jun 2021 15:15), Max: 98.5 (24 Jun 2021 12:22)  HR: 75 (24 Jun 2021 15:15) (75 - 89)  BP: 121/57 (24 Jun 2021 15:15) (121/57 - 138/90)  BP(mean): 73 (24 Jun 2021 15:15) (73 - 73)  RR: 17 (24 Jun 2021 15:15) (16 - 17)  SpO2: 99% (24 Jun 2021 15:15) (99% - 99%)    · CONSTITUTIONAL: Thin, frail appearing, eyes closed, in pain  · ENMT: Airway patent, Nasal mucosa clear. Mouth with normal mucosa. Throat has no vesicles, no oropharyngeal exudates and uvula is midline.  · EYES: Clear bilaterally, pupils equal, round and reactive to light.  · CARDIAC: Normal rate, regular rhythm.  Heart sounds S1, S2.  No murmurs, rubs or gallops.  · RESPIRATORY: Breath sounds clear and equal bilaterally.  · GASTROINTESTINAL: +epigastric TTP  · MUSCULOSKELETAL: Spine appears normal, range of motion is not limited, no muscle or joint tenderness  · NEUROLOGICAL: Alert and oriented to self only, no focal deficits, no motor or sensory deficits.  · SKIN: Skin normal color for race, warm, dry and intact. No evidence of rash.

## 2021-06-24 NOTE — ED PROVIDER NOTE - OBJECTIVE STATEMENT
89 y/o F with PMHx of dementia, breast CA s/p L mastectomy, HTN, OA, s/p TKR, s/p hysterectomy, s/p hernia repair, and s/p cholecystectomy presents to the ED BIBEMS from [facility] for eval of +vomiting today. Staff reports emesis was black in color. Daughter at bedside notes pt was screaming in pain throughout the day yesterday from +abd pain. No fever. NKDA. Pt is poor historian 2/2 baseline dementia.

## 2021-06-24 NOTE — PATIENT PROFILE ADULT - NSPROMEDSADMININFO_GEN_A_NUR
unable to assess cognition impaired unable to assess cognition impaired/crush pills for administration/difficulty swallowing pills

## 2021-06-24 NOTE — ED ADULT NURSE NOTE - NSIMPLEMENTINTERV_GEN_ALL_ED
Implemented All Fall with Harm Risk Interventions:  [place] to call system. Call bell, personal items and telephone within reach. Instruct patient to call for assistance. Room bathroom lighting operational. Non-slip footwear when patient is off stretcher. Physically safe environment: no spills, clutter or unnecessary equipment. Stretcher in lowest position, wheels locked, appropriate side rails in place. Provide visual cue, wrist band, yellow gown, etc. Monitor gait and stability. Monitor for mental status changes and reorient to person, place, and time. Review medications for side effects contributing to fall risk. Reinforce activity limits and safety measures with patient and family. Provide visual clues: red socks.

## 2021-06-24 NOTE — ED ADULT NURSE NOTE - NS ED NOTE ABUSE SUSPICION NEGLECT YN
Date of Service: 09/24/2019    HISTORY OF PRESENT ILLNESS:  The patient is doing quite well.  The patient's strength is much improved.  Still some pain in the right anterior thigh at times.  No numbness.  Walks well, full stride, turns easily.  He is wondering if he can decrease the steroids.  The steroids cause his sugars to go quite high.    PHYSICAL EXAMINATION:  MOTOR:  5/5 iliopsoas, quads, anterior tibialis, gastric bilaterally, trace right quads, 1 left quads, 1 ankle jerk.  Pin intact bilaterally, legs.  GAIT:  Walks well, full stride, turns easily.  MENTAL STATUS:  Patient is appropriate and pleasant.  Thoughts are tight.  Cooperative.  SPEECH:  Normal.  There is no receptive or expressive difficulty.   Mood and affect normal.  VITAL SIGNS:  Blood pressure 122/60, pulse 70, weight 174.    ASSESSMENT:  I think Methylprednisolone can be used 1 per week for 3 weeks and then stop.    I will see him for regular scheduled appointment, which will be on 10/15.  All questions answered to patient's satisfaction.      Dictated By: Clement Eddy Jr., MD  Signing Provider: Clement Eddy Jr., MD JN/karuna ([id number])  DD: 09/24/2019 11:55:57 TD: 09/25/2019 04:51:26    Copy Sent To:     
No

## 2021-06-24 NOTE — ED PROVIDER NOTE - NS_ ATTENDINGSCRIBEDETAILS _ED_A_ED_FT
I, Morales Carlson MD,  performed the initial face to face bedside interview with this patient regarding history of present illness, review of symptoms and relevant past medical, social and family history.  I completed an independent physical examination.    The history, relevant review of systems, past medical and surgical history, medical decision making, and physical examination was documented by the scribe in my presence and I attest to the accuracy of the documentation.

## 2021-06-24 NOTE — H&P ADULT - NSHPLABSRESULTS_GEN_ALL_CORE
13.9   12.11 )-----------( 158      ( 24 Jun 2021 13:18 )             39.9   06-24    140  |  107  |  23  ----------------------------<  136<H>  4.0   |  28  |  0.69    Ca    9.1      24 Jun 2021 13:18    TPro  7.1  /  Alb  3.4  /  TBili  1.1  /  DBili  x   /  AST  32  /  ALT  29  /  AlkPhos  83  06-24

## 2021-06-24 NOTE — H&P ADULT - ASSESSMENT
* Coffee ground emesis and epigastric pain suspect ulcer  NPO  PPI IV  after d/w daughter- GI consult  Pall consult re: comfort care at the NH given the severe dementia  repeat cbc    DNR DNI

## 2021-06-24 NOTE — PATIENT PROFILE ADULT - VISION (WITH CORRECTIVE LENSES IF THE PATIENT USUALLY WEARS THEM):
unable to assess cognition impaired/Normal vision: sees adequately in most situations; can see medication labels, newsprint

## 2021-06-24 NOTE — CHART NOTE - NSCHARTNOTEFT_GEN_A_CORE
CT Abdomen and Pelvis w/ IV Cont (06.24.21 @ 14:56) >  Moderately dilated proximal and mid small bowel loops with abrupt transition in the mid abdomen to collapsed mid and distal small bowel loops best visualized on image 54 of series 3 and image 39 of the coronal reconstructions. These findings are compatible with a moderate to high grade small bowel obstruction likely due to an adhesive band. Recommend plain film follow-up.    Case d/w daughter; cancel GI consult and consult surgery; NGT, IVF; pt is more comfortable after IV Morphine. Consult surgery ED on call DR Tomás estrada.

## 2021-06-24 NOTE — PHARMACOTHERAPY INTERVENTION NOTE - COMMENTS
med history complete, reviewed medications with patient and confirmed with doctor first med profile, all medication related questions answered med history complete, reviewed medications with patients med list from assisted living and confirmed with doctor first med profile, all medication related questions answered

## 2021-06-24 NOTE — CHART NOTE - NSCHARTNOTEFT_GEN_A_CORE
89 y/o F with PMHx of severe  dementia, breast CA s/p L mastectomy, HTN, OA, s/p TKR, s/p hysterectomy, s/p hernia repair, and s/p cholecystectomy presents to the ED [facility]IB EMS [facility] for eval of +vomiting today. Staff reports emesis was black in color.   W/U revealed SBO and NGT was placed .  Vital Signs Last 24 Hrs  T(C): 37.2 (24 Jun 2021 17:00), Max: 37.2 (24 Jun 2021 17:00)  T(F): 99 (24 Jun 2021 17:00), Max: 99 (24 Jun 2021 17:00)  HR: 81 (24 Jun 2021 17:00) (75 - 89)  BP: 140/65 (24 Jun 2021 17:00) (121/57 - 140/65)  BP(mean): 73 (24 Jun 2021 15:15) (73 - 73)  RR: 18 (24 Jun 2021 17:00) (16 - 18)  SpO2: 98% (24 Jun 2021 17:00) (98% - 99%)    Due to dementia patient attempts to remove the tube .  Will apply mittens for 24 hours to avoid injury to the patient and prevent aspiration.   Patient accepted mittens without resistance

## 2021-06-25 LAB
COVID-19 SPIKE DOMAIN AB INTERP: POSITIVE
COVID-19 SPIKE DOMAIN ANTIBODY RESULT: 27.7 U/ML — HIGH
HCT VFR BLD CALC: 34.6 % — SIGNIFICANT CHANGE UP (ref 34.5–45)
HGB BLD-MCNC: 11.3 G/DL — LOW (ref 11.5–15.5)
MCHC RBC-ENTMCNC: 32.7 GM/DL — SIGNIFICANT CHANGE UP (ref 32–36)
MCHC RBC-ENTMCNC: 34.5 PG — HIGH (ref 27–34)
MCV RBC AUTO: 105.5 FL — HIGH (ref 80–100)
PLATELET # BLD AUTO: 113 K/UL — LOW (ref 150–400)
RBC # BLD: 3.28 M/UL — LOW (ref 3.8–5.2)
RBC # FLD: 13.5 % — SIGNIFICANT CHANGE UP (ref 10.3–14.5)
SARS-COV-2 IGG+IGM SERPL QL IA: 27.7 U/ML — HIGH
SARS-COV-2 IGG+IGM SERPL QL IA: POSITIVE
WBC # BLD: 9.83 K/UL — SIGNIFICANT CHANGE UP (ref 3.8–10.5)
WBC # FLD AUTO: 9.83 K/UL — SIGNIFICANT CHANGE UP (ref 3.8–10.5)

## 2021-06-25 PROCEDURE — 99233 SBSQ HOSP IP/OBS HIGH 50: CPT

## 2021-06-25 PROCEDURE — 99223 1ST HOSP IP/OBS HIGH 75: CPT

## 2021-06-25 RX ADMIN — SODIUM CHLORIDE 100 MILLILITER(S): 9 INJECTION, SOLUTION INTRAVENOUS at 21:11

## 2021-06-25 RX ADMIN — MORPHINE SULFATE 3 MILLIGRAM(S): 50 CAPSULE, EXTENDED RELEASE ORAL at 03:26

## 2021-06-25 RX ADMIN — MORPHINE SULFATE 3 MILLIGRAM(S): 50 CAPSULE, EXTENDED RELEASE ORAL at 02:22

## 2021-06-25 RX ADMIN — SODIUM CHLORIDE 100 MILLILITER(S): 9 INJECTION, SOLUTION INTRAVENOUS at 11:43

## 2021-06-25 RX ADMIN — MORPHINE SULFATE 3 MILLIGRAM(S): 50 CAPSULE, EXTENDED RELEASE ORAL at 23:30

## 2021-06-25 RX ADMIN — MORPHINE SULFATE 3 MILLIGRAM(S): 50 CAPSULE, EXTENDED RELEASE ORAL at 23:45

## 2021-06-25 RX ADMIN — PANTOPRAZOLE SODIUM 40 MILLIGRAM(S): 20 TABLET, DELAYED RELEASE ORAL at 09:45

## 2021-06-25 RX ADMIN — MORPHINE SULFATE 3 MILLIGRAM(S): 50 CAPSULE, EXTENDED RELEASE ORAL at 16:17

## 2021-06-25 NOTE — CONSULT NOTE ADULT - SUBJECTIVE AND OBJECTIVE BOX
89 y/o F with PMHx of severe  dementia, breast CA s/p L mastectomy, HTN, OA, s/p TKR, s/p hysterectomy, s/p hernia repair, and s/p cholecystectomy presents to the ED BIBEMS from [facility] for eval of +vomiting today. Staff reports emesis was black in color. Daughter at bedside notes pt was screaming in pain throughout the day yesterday from +abd pain. No fever. NKDA. Pt is poor historian 2/2 baseline severe dementia: non verbal, not recognizing family members. Daughter present DNR, due to the pain I explained options and she decided GI should be consulted if procedure will alleviate Mum's pain. Pt is uncomfortable, in pain.     PAST MEDICAL HISTORY:  Dementia     HTN (Hypertension).     PAST SURGICAL HISTORY:  Hernia     History of Cholecystectomy     History of Hysterectomy     History of Left Mastectomy     Shoulder Disorder.     PE  89 yo female awake, NAD  skin- warm,dry  HEENT- sclerae anicteric, NCAT  Neck- no JVD  Lungs- clear  Cor- RRR 2/6 murmer  Abd- mild distension, + BS soft non tender, tympanitic  Ext- no edema    CT scan - SBO

## 2021-06-25 NOTE — DIETITIAN INITIAL EVALUATION ADULT. - CHIEF COMPLAINT
Patient : Manuel Angeles Age: 67 year old Sex: male   MRN: [id number] Encounter Date: 5/5/2019  ED Bed: /A    History     Chief Complaint   Patient presents with   • Shortness of Breath   • Weakness     HPI    4:30 PM Manuel Angeles is a 67 year old male  with a hx significant for HTN who presents to the ED accompanied by his wife for evaluation of SOB that began 2 days ago and has been accompanied by fatigue. Pt is typically very active and is an avid runner and competitive cyclist. However, over the past 2 days, he cannot walk more than 50 feet without becoming short of breath and reports needing to take frequent breaks when climbing stairs or doing yardwork. SOB quickly resolves when a rest. He also reports L-sided posterior neck pain and diffuse abd pain (sensation of bloating) exacerbated by eating protein-rich foods for the past two days. Last night, he woke up with significant diaphoresis, but denies SOB at that time. Pt has some LUE pain that has been ongoing intermittently for years and is unchanged today. He denies CP, leg swelling, and calf pain. No recent travel. No hx of DVT/PE. Pt quit smoking at age 33. He had a stress test 4 years ago and was offered cardiac catheterization, but declined. Negative hx of DM and HLD. Negative family hx of early cardiac death. He started tamsulosin 4 weeks ago and is also compliant with his other daily medications. There are no additional concerns and no other modifying factors reported at this time.    PCP: Filomena Dalton NP    Allergies   Allergen Reactions   • Diazepam      mood swings   • Ragweed        Current Discharge Medication List      Prior to Admission Medications    Details   metoPROLOL succinate (TOPROL-XL) 25 MG 24 hr tablet Take 1 tablet by mouth daily.  Qty: 90 tablet, Refills: 0      tamsulosin (FLOMAX) 0.4 MG Cap Take 1 capsule by mouth daily after a meal.  Qty: 30 capsule, Refills: 11      aspirin 81 MG tablet Take 81 mg by mouth daily.       losartan (COZAAR) 50 MG tablet Take 50 mg by mouth 2 times daily.             Past Medical History:   Diagnosis Date   • Allergy     rag weed   • B12 deficiency     noted  with concentration issues   • BPH associated with nocturia    • Essential (primary) hypertension    • Hypogonadism in male    • MRSA (methicillin resistant staph aureus) culture positive     noted with buttock abcess   • PAST MEDICAL HISTORY 04    none       Past Surgical History:   Procedure Laterality Date   • HAND/FINGER SURGERY UNLISTED  04    R 5th finger skin tag removed PK   • REMOVE TONSILS/ADENOIDS,<13 Y/O  8 yrs old    T & A   • TONSILLECTOMY     • WISDOM TOOTH EXTRACTION         Family History   Problem Relation Age of Onset   • Heart disease Mother    • Cancer Mother         skin cancer   • Cancer Father         bladder   • Asthma Sister    • Diabetes Son         insulin dependant   • Cancer Maternal Grandfather         colon       Social History     Tobacco Use   • Smoking status: Former Smoker     Last attempt to quit: 1998     Years since quittin.3   • Smokeless tobacco: Former User   Substance Use Topics   • Alcohol use: Yes     Comment: half a bottle of wine a day   • Drug use: Not on file       Review of Systems   Constitutional: Positive for diaphoresis and fatigue. Negative for chills, fever and unexpected weight change.   HENT: Negative for sore throat.    Eyes: Negative for pain and visual disturbance.   Respiratory: Positive for shortness of breath. Negative for cough.    Cardiovascular: Negative for chest pain and leg swelling.   Gastrointestinal: Positive for abdominal pain (\"bloating\"). Negative for blood in stool, diarrhea, nausea and vomiting.   Genitourinary: Negative for dysuria and frequency.   Musculoskeletal: Positive for neck pain. Negative for back pain, myalgias (calf pain bilaterally) and neck stiffness.   Skin: Negative for rash.   Neurological: Negative for dizziness, syncope,  numbness and headaches.   Hematological: Negative for adenopathy. Does not bruise/bleed easily.   Psychiatric/Behavioral: Negative for dysphoric mood and suicidal ideas.       Physical Exam     ED Triage Vitals [05/05/19 1627]   ED Triage Vitals Group      Temp 97.3 °F (36.3 °C)      Pulse 79      Resp 18      /83      SpO2 96 %      EtCO2 mmHg       Height 6' 1\" (1.854 m)      Weight 216 lb 4.3 oz (98.1 kg)      Weight Scale Used ED Actual       Physical Exam   Constitutional: He is oriented to person, place, and time. He appears well-developed and well-nourished. No distress.   HENT:   Mouth/Throat: Oropharynx is clear and moist. No oropharyngeal exudate.   Eyes: Pupils are equal, round, and reactive to light. Conjunctivae and EOM are normal. No scleral icterus.   Neck: Neck supple. No JVD present. No tracheal deviation present. No thyromegaly present.   Cardiovascular: Normal rate, regular rhythm, normal heart sounds and intact distal pulses.   No murmur heard.  Pulses:       Radial pulses are 2+ on the right side, and 2+ on the left side.        Posterior tibial pulses are 2+ on the right side, and 2+ on the left side.   Pulmonary/Chest: Effort normal and breath sounds normal. No stridor. No respiratory distress. He has no wheezes. He has no rales.   Abdominal: Soft. Bowel sounds are normal. He exhibits no distension and no mass. There is no tenderness.   Musculoskeletal: He exhibits no edema or tenderness.   Lymphadenopathy:     He has no cervical adenopathy.   Neurological: He is alert and oriented to person, place, and time. He has normal strength. No sensory deficit.   Skin: Skin is warm and dry. No rash noted. He is not diaphoretic. No erythema. No pallor.   Psychiatric: He has a normal mood and affect.   Nursing note and vitals reviewed.      ED Course     Procedures    Lab Results     Results for orders placed or performed during the hospital encounter of 05/05/19   CBC & Auto Differential   Result  Value Ref Range    WBC 6.5 4.2 - 11.0 K/mcL    RBC 3.98 (L) 4.50 - 5.90 mil/mcL    HGB 12.3 (L) 13.0 - 17.0 g/dL    HCT 35.4 (L) 39.0 - 51.0 %    MCV 88.9 78.0 - 100.0 fl    MCH 30.9 26.0 - 34.0 pg    MCHC 34.7 32.0 - 36.5 g/dL    RDW-CV 11.9 11.0 - 15.0 %     140 - 450 K/mcL    NRBC 0 0 /100 WBC    DIFF TYPE AUTOMATED DIFFERENTIAL     Neutrophil 60 %    LYMPH 17 %    MONO 14 %    EOSIN 7 %    BASO 1 %    Percent Immature Granuloctyes 1 %    Absolute Neutrophil 4.0 1.8 - 7.7 K/mcL    Absolute Lymph 1.1 1.0 - 4.0 K/mcL    Absolute Mono 0.9 0.3 - 0.9 K/mcL    Absolute Eos 0.4 0.1 - 0.5 K/mcL    Absolute Baso 0.1 0.0 - 0.3 K/mcL    Absolute Immature Granulocytes 0.0 0 - 0.2 K/mcl   NT proBNP   Result Value Ref Range    NT proBNP 253 (H) <126 pg/mL   Chem 8 Panel - Point of Care   Result Value Ref Range    Sodium  135 - 145 mmol/L    Potassium POC 3.7 3.4 - 5.1 mmol/L    Chloride  98 - 107 mmol/L    CALCIUM IONIZED-POC 1.18 1.15 - 1.29 mmol/L    CO2 Total 23 19 - 24 mmol/L    GLUCOSE  (H) 65 - 99 mg/dL    BUN POC 21 (H) 6 - 20 mg/dL    HEMATOCRIT POC 35.0 (L) 39.0 - 51.0 %    Hemoglobin POC 11.9 (L) 13.0 - 17.0 g/dL    ANION GAP POC 19 mmol/L    Creatinine POC 1.10 0.67 - 1.17 mg/dL    Estimated GFR  (POC) 80     Estimated GFR Non- (POC) 69    Troponin I - Point of Care   Result Value Ref Range    Troponin I POC <0.10 <0.10 ng/mL   Creatinine Serum   Result Value Ref Range    Creatinine 0.92 0.67 - 1.17 mg/dL    GFR Estimate,  >90     GFR Estimate, Non African American 86    Troponin I Ultra Sensitive   Result Value Ref Range    TROPONIN I <0.02 <0.05 ng/mL       EKG Results     EKG Interpretation  Rate: 82  Rhythm: normal sinus rhythm   Abnormality: nonspecific ST and T wave abnormality  No previous for comparison    EKG interpreted by ED physician    Radiology Results     Imaging Results          XR Chest AP or PA (Final result)  Result time  05/05/19 17:24:12    Final result                 Impression:    IMPRESSION:    1. Probable linear atelectasis at the left lung base.    2. Otherwise, unremarkable study.                      Narrative:    XR CHEST AP OR PA    INDICATION:  shortness of breath    COMPARISON: None.    FINDINGS:    Overlying EKG leads noted.    Probable linear atelectasis at the left lung base.    No consolidations, pneumothoraces, or pleural effusions.    Heart size is grossly normal. Mediastinum is unremarkable.    Subcutaneous soft tissues are grossly unremarkable.    No acute osseous abnormalities.                                ED Medication Orders (From admission, onward)    Start Ordered     Status Ordering Provider    05/05/19 1654 05/05/19 1653  aspirin chewable 324 mg  ONCE      Last MAR action:  Given MARGUERITE PABLO    05/05/19 1654 05/05/19 1653  nitroGLYcerin topical (NITRO-BID) 2 % ointment 1 inch  ONCE      Last MAR action:  Given MARGUERITE PABLO    05/05/19 1654 05/05/19 1653  sodium chloride (PF) 0.9 % injection 2 mL  (Capped IV)  ONCE      Last MAR action:  Given MARGUERITE PABLO    05/05/19 1651 05/05/19 1653    PRN      Discontinued MARGUERITE PABLO               Vitals  Vitals:    05/06/19 0410 05/06/19 0757 05/06/19 1028 05/06/19 1204   BP: 164/77 153/82 132/72 142/82   Pulse: 80 82 82 80   Resp: 20 20  18   Temp: 98 °F (36.7 °C) 98.1 °F (36.7 °C)  98 °F (36.7 °C)   TempSrc: Oral Oral  Oral   SpO2: 96% 96%  96%   Weight:       Height:           ED Course    Chart Review: I reviewed the patient's medications, allergies, and past medical and surgical history in Epic.    4:44 PM I discussed at length indications for the ED work-up as well as the risks, benefits, and alternatives with the pt and his wife. They understand and agree with the plan for assessment.    5:29 PM I rechecked the pt who is resting comfortably. I explained that his initial EKG and troponin were not concerning for  cardiac damage. That said, I recommended continued hospitalization for further cardiac testing due to his presentation and risk factors. I also explained that he is slightly anemic today. He denies black or bloody stools. He had an unremarkable colonoscopy three years ago. He does take 81 mg ASA per day. Pt understands and agrees with the plan. All questions have been addressed.     5:51 PM I spoke with the cardiology fellow regarding the patient's hx, presentation today, and results of the ED work up. I explained that he has been experiencing exertional SOB and fatigue over the past several days despite normally being an active and healthy individual. I noted that troponin today was less than 0.1. We reviewed his EKG demonstrating nonspecific ST and T wave changes. Since the pt's presentation does not include clear-cut CP he is not an appropriate candidate for continued care in the [facility], though the cardiology fellow agrees that further cardiac work-up (including a stress test) is indicated.     6:01 PM I spoke with Dr. Hernandez ([facility] hospitalist) regarding the patient's hx, presentation today, and results of the ED work up. We discussed my conversation with the cardiology fellow. I also noted that he is mildly anemic today and discussed his last colonoscopy. I noted that the pt denies black or bloody stools. Pt is accepted.     [facility]  Critical Care time spent on this patient outside of billable procedures:  none    6:04 PM Does this patient meet Severe Sepsis criteria by CMS SEP-1 definition? No    6:04 PM Does this patient meet Septic Shock criteria by CMS SEP-1 definition? No      Clinical Impression:  ED Diagnosis        Final diagnosis    Dyspnea on exertion                Pt to be admitted to Dr. Hernandez in stable condition.     I have reviewed the information recorded by the scribe for accuracy and agree with its contents.  ____________________________________________________________________    Александр CORTEZ  Oneil, acting as a scribe for Dr. Narayan Garcia  IDX No: [id number]  Scribe: Александр Garcia MD  05/07/19 0920     The patient is a 89y Female complaining of vomiting blood.

## 2021-06-25 NOTE — CONSULT NOTE ADULT - SUBJECTIVE AND OBJECTIVE BOX
HPI: Pt is a 89y old Female with a PMHx of severe  dementia, breast CA s/p L mastectomy, HTN, OA, s/p TKR, s/p hysterectomy, s/p hernia repair, and s/p cholecystectomy presents to the ED from [facility] for eval of +vomiting today which staff reports was black in color. Daughter at bedside notes pt was screaming in pain throughout the day yesterday from +abd pain.  Pt is poor historian 2/2 baseline severe dementia: non verbal, not recognizing family members. Daughter present DNR, due to the pain I explained options and she decided GI should be consulted if procedure will alleviate pain. Pt is uncomfortable, in pain. Palliative medicine Consult to further establish GOC  6/25/21 Seen and examined at bedside with no family present. Restless and disoriented. Grimacing intermittently. Unable to answer questions.     PAIN: (X )Yes   ( )No  Non verbal signs  Grimacing/moaning  DYSPNEA: ( ) Yes  ( X) No    PAST MEDICAL & SURGICAL HISTORY:  HTN (Hypertension)  Dementia  Breast CA  Varicose veins  OA (osteoarthritis)  Hernia  History of Cholecystectomy  History of Hysterectomy  Shoulder Disorder  History of Left Mastectomy  S/P TKR (total knee replacement)    SOCIAL HX:  Lives in LTC  Hx opiate tolerance ( )YES  ( X)NO    Baseline ADLs  (Prior to Admission)  ( ) Independent   (X )Dependent    FAMILY HISTORY:  Unable to obtain due to dementia    Review of Systems:    Unable to obtain/Limited due to: due to dementia      PHYSICAL EXAM:    Vital Signs Last 24 Hrs  T(C): 37.2 (25 Jun 2021 09:00), Max: 38.1 (24 Jun 2021 22:25)  T(F): 98.9 (25 Jun 2021 09:00), Max: 100.5 (24 Jun 2021 22:25)  HR: 60 (25 Jun 2021 09:00) (60 - 118)  BP: 137/54 (25 Jun 2021 09:00) (104/36 - 144/84)  BP(mean): 73 (24 Jun 2021 15:15) (73 - 73)  RR: 14 (25 Jun 2021 09:00) (14 - 18)  SpO2: 98% (25 Jun 2021 09:00) (97% - 99%)  Daily Height in cm: 152.4 (24 Jun 2021 12:22)      PPSV2:  10-20 %  FAST: 7C    General: Frail elderly female in bed in mild distress  Mental Status: restless/non verbal  HEENT: oral mucosa dry  Lungs: clear diminished luis felipe  Cardiac: S1S2+  GI: abd soft ? tenderness hypoactive BS  : voids  Ext: GRACE on bed  Neuro: dementia      LABS:                        11.3   9.83  )-----------( 113      ( 25 Jun 2021 07:53 )             34.6     06-24    140  |  107  |  23  ----------------------------<  136<H>  4.0   |  28  |  0.69    Ca    9.1      24 Jun 2021 13:18    TPro  7.1  /  Alb  3.4  /  TBili  1.1  /  DBili  x   /  AST  32  /  ALT  29  /  AlkPhos  83  06-24      Albumin: Albumin, Serum: 3.4 g/dL (06-24 @ 13:18)      Allergies    No Known Allergies    Intolerances      MEDICATIONS  (STANDING):  dextrose 5% + sodium chloride 0.9%. 1000 milliLiter(s) (100 mL/Hr) IV Continuous <Continuous>  pantoprazole  Injectable 40 milliGRAM(s) IV Push daily    MEDICATIONS  (PRN):  acetaminophen  Suppository .. 650 milliGRAM(s) Rectal every 6 hours PRN Temp greater or equal to 38C (100.4F), Mild Pain (1 - 3)  aluminum hydroxide/magnesium hydroxide/simethicone Suspension 30 milliLiter(s) Oral every 4 hours PRN Dyspepsia  morphine  - Injectable 3 milliGRAM(s) IV Push every 3 hours PRN Severe Pain (7 - 10)  ondansetron Injectable 4 milliGRAM(s) IV Push every 6 hours PRN Nausea      RADIOLOGY/ADDITIONAL STUDIES:  < from: CT Abdomen and Pelvis w/ IV Cont (06.24.21 @ 14:56) >    EXAM:  CT ABDOMEN AND PELVIS IC                            PROCEDURE DATE:  06/24/2021          INTERPRETATION:  CLINICAL INFORMATION: Diffuse abdominal pain and vomiting.    COMPARISON: October 21, 2019.    CONTRAST/COMPLICATIONS:  IV Contrast: Omnipaque 350  90 cc administered   10 cc discarded  Oral Contrast: NONE  Complications: None reported at time of study completion    PROCEDURE:  CT of the Abdomen and Pelvis was performed.  Sagittal and coronal reformats were performed.    FINDINGS:  LOWER CHEST: Left mastectomy. Mild subpleural reticulation in the left upper lobe anterolaterally.    LIVER: Within normal limits.  BILE DUCTS: Stable moderate intra and extrahepatic biliary duct dilatation.  GALLBLADDER: Removed.  SPLEEN: Within normal limits.  PANCREAS: Moderate atrophy and fatty replacement.  ADRENALS: Within normal limits.  KIDNEYS/URETERS: Stable bilateral parapelvic renal cysts. Otherwise, within normal limits.    BLADDER: Tiny layering stones in the bladder.  REPRODUCTIVE ORGANS: The uterus has been removed.    BOWEL: Large stool in the rectal vault. There are moderately dilated loops of proximal and mid small bowel measuring up to 3.4 cm in diameter extending to the left mid abdomen with abrupt transition to collapsed mid and distal small bowel in the midline abdomen, best visualized on image 54 of series 3 and image 39 of the coronal reconstruction. Again is noted a 4.8 cm left anterior pelvic wall hernia containing entering in a sitting dilated loops and collapsedloops with no evidence of transition at the level of the hernia. There is no pneumatosis or extraluminal gas. There is trace fluid tracking throughout the peritoneal cavity and mesentery into the pelvis.  PERITONEUM: No ascites.  VESSELS: Extensive atherosclerotic disease of the nonaneurysmal abdominal aorta.  RETROPERITONEUM/LYMPH NODES: No lymphadenopathy.  ABDOMINAL WALL: Within normal limits.  BONES: Status post bilateral shoulder arthroplasty.    IMPRESSION: Moderately dilated proximal and mid small bowel loops with abrupt transition in the mid abdomen to collapsed mid and distal small bowel loops best visualized on image 54 of series 3 and image 39 of the coronal reconstructions. These findings are compatible with a moderate to high grade small bowel obstruction likely due to an adhesive band. Recommend plain film follow-up.      < from: Xray Chest 1 View-PORTABLE IMMEDIATE (Xray Chest 1 View-PORTABLE IMMEDIATE .) (06.24.21 @ 14:46) >    EXAM:  XR CHEST PORTABLE IMMED 1V                            PROCEDURE DATE:  06/24/2021          INTERPRETATION:  TECHNIQUE: A single AP view of the chest was obtained. Ordered time:   6/24/2021 2:46 PM    COMPARISON: 10/21/2019    CLINICAL INFORMATION: Gastrointestinal hemorrhage    FINDINGS:    The heart size is not well assessed on AP film.  The aorta is calcified.  The lungs are clear.  There are no pleural effusions.  There are clips in the left axilla. The patient is status post left total shoulder replacement and right reverse total shoulder replacement. Surgical clips are seen in the upper abdomen.    IMPRESSION:    No acute pulmonary disease

## 2021-06-25 NOTE — DIETITIAN INITIAL EVALUATION ADULT. - PERTINENT LABORATORY DATA
06-24 Na140 mmol/L Glu 136 mg/dL<H> K+ 4.0 mmol/L Cr  0.69 mg/dL BUN 23 mg/dL Phos n/a   Alb 3.4 g/dL PAB n/a

## 2021-06-25 NOTE — DIETITIAN INITIAL EVALUATION ADULT. - ADD RECOMMEND
advance diet to regular when medically feasible.  Suggest palliative consult.  Supplement on advance.  Total feed assist. Monitor PO intake, tolerance, labs and weight.

## 2021-06-25 NOTE — DIETITIAN INITIAL EVALUATION ADULT. - OTHER INFO
89 y/o F with PMHx of severe  dementia, breast CA s/p L mastectomy, HTN, OA, s/p TKR, s/p hysterectomy, s/p hernia repair, and s/p cholecystectomy presents to the ED BIBEMS from [facility] for eval of +vomiting today. Staff reports emesis was black in color. Daughter at bedside notes pt was screaming in pain throughout the day yesterday from +abd pain. No fever. NKDA. Pt is poor historian 2/2 baseline severe dementia: non verbal, not recognizing family members. Daughter present DNR, due to the pain I explained options and she decided GI should be consulted if procedure will alleviate Mum's pain. Pt is uncomfortable, in pain.

## 2021-06-25 NOTE — DIETITIAN NUTRITION RISK NOTIFICATION - ADDITIONAL COMMENTS/DIETITIAN RECOMMENDATIONS
suggest advance diet to regular when medically feasible  pt has NG tube, which she has tried to remove  suggest Palliative consult to determine feasibility of nutritional support  add Ensure enlive 8 oz tid on diet advance  Feed assist if on PO   Weekly weights  Monitor PO intake, tolerance, labs and weight.

## 2021-06-25 NOTE — DIETITIAN INITIAL EVALUATION ADULT. - NAME AND PHONE
Minal Crawford RDN, CDN, [facility]      [phone]   [email or web address]

## 2021-06-25 NOTE — DIETITIAN INITIAL EVALUATION ADULT. - PERTINENT MEDS FT
MEDICATIONS  (STANDING):  dextrose 5% + sodium chloride 0.9%. 1000 milliLiter(s) (100 mL/Hr) IV Continuous <Continuous>  pantoprazole  Injectable 40 milliGRAM(s) IV Push daily    MEDICATIONS  (PRN):  acetaminophen  Suppository .. 650 milliGRAM(s) Rectal every 6 hours PRN Temp greater or equal to 38C (100.4F), Mild Pain (1 - 3)  aluminum hydroxide/magnesium hydroxide/simethicone Suspension 30 milliLiter(s) Oral every 4 hours PRN Dyspepsia  morphine  - Injectable 3 milliGRAM(s) IV Push every 3 hours PRN Severe Pain (7 - 10)  ondansetron Injectable 4 milliGRAM(s) IV Push every 6 hours PRN Nausea

## 2021-06-26 PROCEDURE — 74019 RADEX ABDOMEN 2 VIEWS: CPT | Mod: 26

## 2021-06-26 PROCEDURE — 99232 SBSQ HOSP IP/OBS MODERATE 35: CPT

## 2021-06-26 RX ADMIN — PANTOPRAZOLE SODIUM 40 MILLIGRAM(S): 20 TABLET, DELAYED RELEASE ORAL at 09:41

## 2021-06-26 RX ADMIN — MORPHINE SULFATE 3 MILLIGRAM(S): 50 CAPSULE, EXTENDED RELEASE ORAL at 22:03

## 2021-06-26 RX ADMIN — SODIUM CHLORIDE 100 MILLILITER(S): 9 INJECTION, SOLUTION INTRAVENOUS at 05:24

## 2021-06-26 RX ADMIN — SODIUM CHLORIDE 100 MILLILITER(S): 9 INJECTION, SOLUTION INTRAVENOUS at 14:25

## 2021-06-26 RX ADMIN — MORPHINE SULFATE 3 MILLIGRAM(S): 50 CAPSULE, EXTENDED RELEASE ORAL at 07:32

## 2021-06-26 NOTE — PROGRESS NOTE ADULT - ASSESSMENT
1. SBO  conservative management  s/p NGT on suction - patient pulled out the NGT  Surgery consult appreciated   Start Clears  c/w IV fluids   repeat electrolytes   IV PPI  FU repeat ABd Xray    2. Dementia    Palliative care meeting for [facility]  DNR/DNI

## 2021-06-27 PROCEDURE — 99232 SBSQ HOSP IP/OBS MODERATE 35: CPT

## 2021-06-27 RX ADMIN — PANTOPRAZOLE SODIUM 40 MILLIGRAM(S): 20 TABLET, DELAYED RELEASE ORAL at 10:09

## 2021-06-27 RX ADMIN — MORPHINE SULFATE 3 MILLIGRAM(S): 50 CAPSULE, EXTENDED RELEASE ORAL at 11:55

## 2021-06-27 RX ADMIN — Medication 650 MILLIGRAM(S): at 08:29

## 2021-06-27 RX ADMIN — SODIUM CHLORIDE 100 MILLILITER(S): 9 INJECTION, SOLUTION INTRAVENOUS at 11:10

## 2021-06-27 RX ADMIN — MORPHINE SULFATE 3 MILLIGRAM(S): 50 CAPSULE, EXTENDED RELEASE ORAL at 06:03

## 2021-06-27 RX ADMIN — SODIUM CHLORIDE 100 MILLILITER(S): 9 INJECTION, SOLUTION INTRAVENOUS at 01:11

## 2021-06-27 RX ADMIN — MORPHINE SULFATE 3 MILLIGRAM(S): 50 CAPSULE, EXTENDED RELEASE ORAL at 22:31

## 2021-06-27 NOTE — PROGRESS NOTE ADULT - ASSESSMENT
1. SBO  conservative management  s/p NGT on suction - patient pulled out the NGT  Surgery consult appreciated   c/w IV fluids   repeat electrolytes   IV PPI  repeat ABd Xray - resolving SBO  Advance to mechanical soft diet     2. Dementia  Palliative care meeting for [place] [facility]    #Code Status DNR/DNI

## 2021-06-28 LAB
ANION GAP SERPL CALC-SCNC: 3 MMOL/L — LOW (ref 5–17)
BUN SERPL-MCNC: 8 MG/DL — SIGNIFICANT CHANGE UP (ref 7–23)
CALCIUM SERPL-MCNC: 7.6 MG/DL — LOW (ref 8.5–10.1)
CHLORIDE SERPL-SCNC: 115 MMOL/L — HIGH (ref 96–108)
CO2 SERPL-SCNC: 27 MMOL/L — SIGNIFICANT CHANGE UP (ref 22–31)
CREAT SERPL-MCNC: 0.49 MG/DL — LOW (ref 0.5–1.3)
GLUCOSE SERPL-MCNC: 127 MG/DL — HIGH (ref 70–99)
HCT VFR BLD CALC: 30.9 % — LOW (ref 34.5–45)
HGB BLD-MCNC: 10.1 G/DL — LOW (ref 11.5–15.5)
LACTATE SERPL-SCNC: 1.8 MMOL/L — SIGNIFICANT CHANGE UP (ref 0.7–2)
MAGNESIUM SERPL-MCNC: 1.6 MG/DL — SIGNIFICANT CHANGE UP (ref 1.6–2.6)
MCHC RBC-ENTMCNC: 32.7 GM/DL — SIGNIFICANT CHANGE UP (ref 32–36)
MCHC RBC-ENTMCNC: 34.7 PG — HIGH (ref 27–34)
MCV RBC AUTO: 106.2 FL — HIGH (ref 80–100)
PHOSPHATE SERPL-MCNC: 1.6 MG/DL — LOW (ref 2.5–4.5)
PLATELET # BLD AUTO: 89 K/UL — LOW (ref 150–400)
POTASSIUM SERPL-MCNC: 2.5 MMOL/L — CRITICAL LOW (ref 3.5–5.3)
POTASSIUM SERPL-SCNC: 2.5 MMOL/L — CRITICAL LOW (ref 3.5–5.3)
RBC # BLD: 2.91 M/UL — LOW (ref 3.8–5.2)
RBC # FLD: 13.4 % — SIGNIFICANT CHANGE UP (ref 10.3–14.5)
SODIUM SERPL-SCNC: 145 MMOL/L — SIGNIFICANT CHANGE UP (ref 135–145)
WBC # BLD: 6.36 K/UL — SIGNIFICANT CHANGE UP (ref 3.8–10.5)
WBC # FLD AUTO: 6.36 K/UL — SIGNIFICANT CHANGE UP (ref 3.8–10.5)

## 2021-06-28 PROCEDURE — 99497 ADVNCD CARE PLAN 30 MIN: CPT

## 2021-06-28 PROCEDURE — 99233 SBSQ HOSP IP/OBS HIGH 50: CPT

## 2021-06-28 PROCEDURE — 99498 ADVNCD CARE PLAN ADDL 30 MIN: CPT

## 2021-06-28 PROCEDURE — 71045 X-RAY EXAM CHEST 1 VIEW: CPT | Mod: 26

## 2021-06-28 RX ORDER — MORPHINE SULFATE 50 MG/1
5 CAPSULE, EXTENDED RELEASE ORAL EVERY 4 HOURS
Refills: 0 | Status: DISCONTINUED | OUTPATIENT
Start: 2021-06-28 | End: 2021-06-30

## 2021-06-28 RX ORDER — POTASSIUM CHLORIDE 20 MEQ
20 PACKET (EA) ORAL
Refills: 0 | Status: DISCONTINUED | OUTPATIENT
Start: 2021-06-28 | End: 2021-06-28

## 2021-06-28 RX ORDER — POTASSIUM CHLORIDE 20 MEQ
10 PACKET (EA) ORAL
Refills: 0 | Status: COMPLETED | OUTPATIENT
Start: 2021-06-28 | End: 2021-06-28

## 2021-06-28 RX ORDER — POTASSIUM CHLORIDE 20 MEQ
40 PACKET (EA) ORAL ONCE
Refills: 0 | Status: COMPLETED | OUTPATIENT
Start: 2021-06-28 | End: 2021-06-28

## 2021-06-28 RX ORDER — PIPERACILLIN AND TAZOBACTAM 4; .5 G/20ML; G/20ML
3.38 INJECTION, POWDER, LYOPHILIZED, FOR SOLUTION INTRAVENOUS EVERY 8 HOURS
Refills: 0 | Status: DISCONTINUED | OUTPATIENT
Start: 2021-06-28 | End: 2021-06-30

## 2021-06-28 RX ORDER — QUETIAPINE FUMARATE 200 MG/1
12.5 TABLET, FILM COATED ORAL EVERY 12 HOURS
Refills: 0 | Status: DISCONTINUED | OUTPATIENT
Start: 2021-06-28 | End: 2021-06-30

## 2021-06-28 RX ORDER — SODIUM,POTASSIUM PHOSPHATES 278-250MG
1 POWDER IN PACKET (EA) ORAL THREE TIMES A DAY
Refills: 0 | Status: COMPLETED | OUTPATIENT
Start: 2021-06-28 | End: 2021-06-29

## 2021-06-28 RX ORDER — PIPERACILLIN AND TAZOBACTAM 4; .5 G/20ML; G/20ML
3.38 INJECTION, POWDER, LYOPHILIZED, FOR SOLUTION INTRAVENOUS ONCE
Refills: 0 | Status: COMPLETED | OUTPATIENT
Start: 2021-06-28 | End: 2021-06-28

## 2021-06-28 RX ADMIN — Medication 100 MILLIEQUIVALENT(S): at 15:34

## 2021-06-28 RX ADMIN — MORPHINE SULFATE 3 MILLIGRAM(S): 50 CAPSULE, EXTENDED RELEASE ORAL at 07:53

## 2021-06-28 RX ADMIN — Medication 40 MILLIEQUIVALENT(S): at 14:31

## 2021-06-28 RX ADMIN — SODIUM CHLORIDE 100 MILLILITER(S): 9 INJECTION, SOLUTION INTRAVENOUS at 06:43

## 2021-06-28 RX ADMIN — Medication 1 PACKET(S): at 22:18

## 2021-06-28 RX ADMIN — PIPERACILLIN AND TAZOBACTAM 200 GRAM(S): 4; .5 INJECTION, POWDER, LYOPHILIZED, FOR SOLUTION INTRAVENOUS at 14:30

## 2021-06-28 RX ADMIN — PIPERACILLIN AND TAZOBACTAM 25 GRAM(S): 4; .5 INJECTION, POWDER, LYOPHILIZED, FOR SOLUTION INTRAVENOUS at 22:18

## 2021-06-28 RX ADMIN — PANTOPRAZOLE SODIUM 40 MILLIGRAM(S): 20 TABLET, DELAYED RELEASE ORAL at 09:17

## 2021-06-28 RX ADMIN — Medication 650 MILLIGRAM(S): at 10:36

## 2021-06-28 RX ADMIN — Medication 100 MILLIEQUIVALENT(S): at 18:06

## 2021-06-28 RX ADMIN — Medication 100 MILLIEQUIVALENT(S): at 16:47

## 2021-06-28 RX ADMIN — Medication 1 PACKET(S): at 14:31

## 2021-06-28 NOTE — SWALLOW BEDSIDE ASSESSMENT ADULT - PHARYNGEAL PHASE
Swallow triggered in an acceptable time frame for age. Laryngeal lift on palpation during swallowing trials was felt to be grossly functional for age. NO behavioral aspiration signs exhibited.

## 2021-06-28 NOTE — SWALLOW BEDSIDE ASSESSMENT ADULT - SWALLOW EVAL: DIAGNOSIS
1) On encounter, a loss of bulk was evident in strap muscle regions. Pt missing several teeth. She was awake but distractible, fidgety & tearful at times. Pt occasionally followed a limited # of 1 step verbal commands & periodically verbalized during communicative probes. At these times, her verbalizations were brief, variably unintelligible & contextually irrelevant when decipherable c/w Cognitive Dysfunction in setting of encephalopathy related to acute illness(i.e PNA, SBO, hypokalemia, hypophosphatemia) as well as Dementia. 2) Pt exhibits reduced orientation to feeding atop Oral Dysphagia of mild to moderate severity which appeared to be a functional condition with a restricted inventory of modified food consistencies. Underlying pharyngeal integrity subjectively appeared to be grossly functional for age. NO behavioral aspiration signs exhibited. 1) On encounter, a loss of bulk was evident in strap muscle regions. Pt missing several teeth. She was awake but distractible, fidgety & tearful at times. Pt occasionally followed a limited # of 1 step verbal commands & periodically verbalized during communicative probes. At these times, her verbalizations were brief, variably unintelligible & contextually irrelevant when decipherable c/w Cognitive Dysfunction in setting of encephalopathy related to acute illness(i.e PNA, SBO, hypokalemia, hypophosphatemia) as well as Dementia.  2) Pt exhibits reduced orientation to feeding atop Oral Dysphagia of mild to moderate severity which appeared to be a functional condition with a restricted inventory of modified food consistencies. Underlying pharyngeal integrity subjectively appeared to be grossly functional for age. NO behavioral aspiration signs exhibited. Oral Dysphagia in setting of encephalopathy/Dementia/many missing teeth.

## 2021-06-28 NOTE — GOALS OF CARE CONVERSATION - ADVANCED CARE PLANNING - CONVERSATION DETAILS
The role of Palliative medicine was reviewed. The pt's daughter discussed her mother's significant decline since4 admitted to a memory care unit at [facility]. Due to COVID she had not been able to see her. She was admitted with sever abd pain and was found to have a SBO which jay since resolved. Her dementia is advanced and she is unable to express pain other than non verbal signs such as grimacing and restlessness. We discussed her overall goal and she would like to focus on comfort. She is hopeful that she can return to [facility] with hospice services either S or [facility], whichever [facility] approves. BOWEN DNR/DNI on chart. Discussed with  and RN

## 2021-06-28 NOTE — SWALLOW BEDSIDE ASSESSMENT ADULT - COMMENTS
The pt was admitted to  with abdominal discomfort and dark colored emesis. Hospital course is notable for resolving SBO, pneumonia, hypokalemia, hypophosphatemia, and confusion. This profile is superimposed upon a history of OA, varicose veins, HTN., previous breast cancer s/p left mastectomy, previous TKR, prior shoulder surgery, past choley and previous hernia repair, The pt was admitted to  with abdominal discomfort and dark colored emesis. Hospital course is notable for resolving SBO, pneumonia, hypokalemia, hypophosphatemia, and confusion. This profile is superimposed upon a history of Dementia, depression, Vitamin B12/D deficiencies, constipation, OA, varicose veins, HTN., previous breast cancer s/p left mastectomy, previous TKR, prior shoulder surgery, past choley and previous hernia repair, The pt was admitted to  with abdominal discomfort and dark colored emesis. Hospital course is notable for resolving SBO, pneumonia, hypokalemia, hypophosphatemia, and confusion. This profile is superimposed upon a history of Dementia, depression, Vitamin B12/D deficiencies, constipation, OA, varicose veins, HTN, previous breast cancer s/p left mastectomy, previous TKR, prior shoulder surgery, past choley and previous hernia repair,

## 2021-06-28 NOTE — SWALLOW BEDSIDE ASSESSMENT ADULT - SWALLOW EVAL: RECOMMENDED DIET
A MECHANICAL SOFT TEXTURE DIET WITH THIN LIQUIDS IS THE LEAST RESTRICTIVE TOLERABLE DIET CONSISTENCIES FROM AN OROPHARYNGEAL SWALLOWING STANCE ON CLINICAL EXAM. NOTE THAT IF PT NEEDS TO BE ON A LIQUID DIET FOR GI REASONS, SHE IS ABLE TO TOLERATE THIN LIQUIDS FROM AN OROPHARYNGEAL SWALLOWING STANCE AS WELL.

## 2021-06-28 NOTE — PROGRESS NOTE ADULT - ASSESSMENT
HPI: Pt is a 89y old Female with a PMHx of severe  dementia, breast CA s/p L mastectomy, HTN, OA, s/p TKR, s/p hysterectomy, s/p hernia repair, and s/p cholecystectomy presents to the ED from [facility] for eval of +vomiting today which staff reports was black in color. Daughter at bedside notes pt was screaming in pain throughout the day yesterday from +abd pain.  Pt is poor historian 2/2 baseline severe dementia: non verbal, not recognizing family members. Daughter present DNR, due to the pain I explained options and she decided GI should be consulted if procedure will alleviate pain. Pt is uncomfortable, in pain. Palliative medicine Consult to further establish GOC  6/25/21 Seen and examined at bedside with no family present. Restless and disoriented. Grimacing intermittently. Unable to answer questions.     Assessment and Plan:    1) SBO  -CT abd/pelvis noted  -No non verbal signs of abd pain  -NG to cont suction D/C  -Cont Morphine 3 mg IVP Q3H PRN severe pain  -Surgical eval noted  Abd Xray 6/24 noted    2) Pain  -resolving  -R/T SBO  -Cont Morphine as above  -Supportive care    3) Debility  -Advanced dementia  -FAST 7C  -Poor PO intake PTA  -NPO  -Severe protein brigida malnutrition  -Immobility  -Restraints for safety  -agitation    4) Advanced Directives  -Pt without capacity  -Daughter Katherin Pruitt  MARIPOSA DNR/DNI   -Will schedule GOC discussion

## 2021-06-28 NOTE — PROGRESS NOTE ADULT - ASSESSMENT
#SBO  conservative management  s/p NGT on suction - patient pulled out the NGT  Surgery consult appreciated   cS/P IV fluids   IV PPI  repeat ABd Xray - resolving SBO  Advance to mechanical soft diet     #Fever 2ndry to Aspiration/GNR PNA  Start Zosyn  FU cultures  ID consult   Swallow evaluation    #Hypokalemia/Hypophosphatemia  -replete IV and monitor     #Dementia  Palliative care consult appreciated     #Code Status DNR/DNI

## 2021-06-28 NOTE — SWALLOW BEDSIDE ASSESSMENT ADULT - ORAL PHASE
Bolus formation/transfer were mildly to moderately prolonged and discontinuous but felt to be mechanically functional with the above modified food textures. Piecemeal deglutition was evident. Mild+ tongue debris noted with mechanical soft solids.

## 2021-06-28 NOTE — SWALLOW BEDSIDE ASSESSMENT ADULT - SLP GENERAL OBSERVATIONS
On encounter, a loss of bulk was evident in strap muscle regions. Pt missing several teeth. She was awake but distractible, fidgety & tearful at times. Pt occasionally followed a limited # of 1 step verbal commands & periodically verbalized during communicative probes. At these times, her verbalizations were brief, variably unintelligible & contextually irrelevant when decipherable c/w Cognitive Dysfunction in setting of encephalopathy related to acute illness(i.e PNA, SBO, hypokalemia, hypophosphatemia) as well as Dementia.

## 2021-06-29 ENCOUNTER — TRANSCRIPTION ENCOUNTER (OUTPATIENT)
Age: 86
End: 2021-06-29

## 2021-06-29 LAB
ANION GAP SERPL CALC-SCNC: 6 MMOL/L — SIGNIFICANT CHANGE UP (ref 5–17)
BUN SERPL-MCNC: 11 MG/DL — SIGNIFICANT CHANGE UP (ref 7–23)
CALCIUM SERPL-MCNC: 7.6 MG/DL — LOW (ref 8.5–10.1)
CHLORIDE SERPL-SCNC: 114 MMOL/L — HIGH (ref 96–108)
CO2 SERPL-SCNC: 25 MMOL/L — SIGNIFICANT CHANGE UP (ref 22–31)
CREAT SERPL-MCNC: 0.47 MG/DL — LOW (ref 0.5–1.3)
GLUCOSE SERPL-MCNC: 103 MG/DL — HIGH (ref 70–99)
HCT VFR BLD CALC: 28.4 % — LOW (ref 34.5–45)
HGB BLD-MCNC: 9.7 G/DL — LOW (ref 11.5–15.5)
MAGNESIUM SERPL-MCNC: 1.5 MG/DL — LOW (ref 1.6–2.6)
MCHC RBC-ENTMCNC: 34.2 GM/DL — SIGNIFICANT CHANGE UP (ref 32–36)
MCHC RBC-ENTMCNC: 35.9 PG — HIGH (ref 27–34)
MCV RBC AUTO: 105.2 FL — HIGH (ref 80–100)
PHOSPHATE SERPL-MCNC: 2.2 MG/DL — LOW (ref 2.5–4.5)
PLATELET # BLD AUTO: 83 K/UL — LOW (ref 150–400)
POTASSIUM SERPL-MCNC: 2.9 MMOL/L — CRITICAL LOW (ref 3.5–5.3)
POTASSIUM SERPL-SCNC: 2.9 MMOL/L — CRITICAL LOW (ref 3.5–5.3)
RBC # BLD: 2.7 M/UL — LOW (ref 3.8–5.2)
RBC # FLD: 13.3 % — SIGNIFICANT CHANGE UP (ref 10.3–14.5)
SODIUM SERPL-SCNC: 145 MMOL/L — SIGNIFICANT CHANGE UP (ref 135–145)
WBC # BLD: 5.54 K/UL — SIGNIFICANT CHANGE UP (ref 3.8–10.5)
WBC # FLD AUTO: 5.54 K/UL — SIGNIFICANT CHANGE UP (ref 3.8–10.5)

## 2021-06-29 PROCEDURE — 99232 SBSQ HOSP IP/OBS MODERATE 35: CPT

## 2021-06-29 PROCEDURE — 73080 X-RAY EXAM OF ELBOW: CPT | Mod: 26,RT

## 2021-06-29 PROCEDURE — 73030 X-RAY EXAM OF SHOULDER: CPT | Mod: 26,RT

## 2021-06-29 PROCEDURE — 99233 SBSQ HOSP IP/OBS HIGH 50: CPT

## 2021-06-29 RX ORDER — POTASSIUM CHLORIDE 20 MEQ
10 PACKET (EA) ORAL
Refills: 0 | Status: COMPLETED | OUTPATIENT
Start: 2021-06-29 | End: 2021-06-29

## 2021-06-29 RX ORDER — POTASSIUM CHLORIDE 20 MEQ
40 PACKET (EA) ORAL ONCE
Refills: 0 | Status: COMPLETED | OUTPATIENT
Start: 2021-06-29 | End: 2021-06-29

## 2021-06-29 RX ADMIN — MORPHINE SULFATE 3 MILLIGRAM(S): 50 CAPSULE, EXTENDED RELEASE ORAL at 20:21

## 2021-06-29 RX ADMIN — Medication 100 MILLIEQUIVALENT(S): at 18:52

## 2021-06-29 RX ADMIN — Medication 100 MILLIEQUIVALENT(S): at 19:53

## 2021-06-29 RX ADMIN — Medication 1 PACKET(S): at 05:34

## 2021-06-29 RX ADMIN — PANTOPRAZOLE SODIUM 40 MILLIGRAM(S): 20 TABLET, DELAYED RELEASE ORAL at 09:14

## 2021-06-29 RX ADMIN — Medication 100 MILLIEQUIVALENT(S): at 22:22

## 2021-06-29 RX ADMIN — MORPHINE SULFATE 3 MILLIGRAM(S): 50 CAPSULE, EXTENDED RELEASE ORAL at 20:57

## 2021-06-29 RX ADMIN — PIPERACILLIN AND TAZOBACTAM 25 GRAM(S): 4; .5 INJECTION, POWDER, LYOPHILIZED, FOR SOLUTION INTRAVENOUS at 05:33

## 2021-06-29 RX ADMIN — Medication 62.5 MILLIMOLE(S): at 17:58

## 2021-06-29 RX ADMIN — PIPERACILLIN AND TAZOBACTAM 25 GRAM(S): 4; .5 INJECTION, POWDER, LYOPHILIZED, FOR SOLUTION INTRAVENOUS at 14:33

## 2021-06-29 RX ADMIN — Medication 40 MILLIEQUIVALENT(S): at 17:19

## 2021-06-29 NOTE — PROGRESS NOTE ADULT - ASSESSMENT
HPI: Pt is a 89y old Female with a PMHx of severe  dementia, breast CA s/p L mastectomy, HTN, OA, s/p TKR, s/p hysterectomy, s/p hernia repair, and s/p cholecystectomy presents to the ED from [facility] for eval of +vomiting today which staff reports was black in color. Daughter at bedside notes pt was screaming in pain throughout the day yesterday from +abd pain.  Pt is poor historian 2/2 baseline severe dementia: non verbal, not recognizing family members. Daughter present DNR, due to the pain I explained options and she decided GI should be consulted if procedure will alleviate pain. Pt is uncomfortable, in pain. Palliative medicine Consult to further establish GOC  6/25/21 Seen and examined at bedside with no family present. Restless and disoriented. Grimacing intermittently. Unable to answer questions.     Assessment and Plan:    1) SBO  -CT abd/pelvis noted  -No non verbal signs of abd pain  -NG to cont suction D/C  -Cont Morphine 3 mg IVP Q3H PRN severe pain  -Add Morphine 5 mg PO Q4H PRN mod pain  -Surgical eval noted  -Abd Xray 6/24 noted    2) Pain  -resolving  -R/T SBO  -Cont Morphine as above  -Supportive care    3) Debility  -Advanced dementia  -FAST 7C  -Poor PO intake PTA  -NPO  -Severe protein brigida malnutrition  -Immobility  -Restraints for safety  -agitation  -Add Seroquel 12 mg PO Q12H PRN agitation    4) Advanced Directives  -Pt without capacity  -Daughter Katherin Pruitt  -BOWEN DNR/DNI   -GOC discussion with daughter 6/28  -Transition to [facility] with hospice care  -Discussed with ASHLEY

## 2021-06-29 NOTE — PHYSICAL THERAPY INITIAL EVALUATION ADULT - GENERAL OBSERVATIONS, REHAB EVAL
Pt was found lying in bed in 2SW, pt is very confused and unable to participate in PT, mumbling some words but illogical talking, smiling at times but moaning in pain when left hand was touched, BLE in Hip-knee flexion with left hip in ext rot and Rt hip in int rotation.

## 2021-06-29 NOTE — CONSULT NOTE ADULT - SUBJECTIVE AND OBJECTIVE BOX
89 y/o F with PMHx of severe  dementia, breast CA s/p L mastectomy, HTN, OA, s/p TKR, s/p hysterectomy, s/p hernia repair, and s/p cholecystectomy admitted on 6/24 from snf from [facility] for eval of +vomiting today on the day of admission. History per medical record as patient unable to provide history.     PMH/SH as above  Fam History : unable to obtain secondary to patient medical condition       MEDICATIONS  (STANDING):  pantoprazole  Injectable 40 milliGRAM(s) IV Push daily  piperacillin/tazobactam IVPB.. 3.375 Gram(s) IV Intermittent every 8 hours    MEDICATIONS  (PRN):  acetaminophen  Suppository .. 650 milliGRAM(s) Rectal every 6 hours PRN Temp greater or equal to 38C (100.4F), Mild Pain (1 - 3)  aluminum hydroxide/magnesium hydroxide/simethicone Suspension 30 milliLiter(s) Oral every 4 hours PRN Dyspepsia  morphine   Solution 5 milliGRAM(s) Oral every 4 hours PRN Severe Pain (7 - 10)dyspnea  morphine  - Injectable 3 milliGRAM(s) IV Push every 3 hours PRN Severe Pain (7 - 10)  ondansetron Injectable 4 milliGRAM(s) IV Push every 6 hours PRN Nausea  QUEtiapine 12.5 milliGRAM(s) Oral every 12 hours PRN agitation      Vital Signs Last 24 Hrs  T(C): 37.2 (29 Jun 2021 09:01), Max: 37.9 (29 Jun 2021 02:09)  T(F): 99 (29 Jun 2021 09:01), Max: 100.3 (29 Jun 2021 02:09)  HR: 69 (29 Jun 2021 09:01) (69 - 98)  BP: 127/49 (29 Jun 2021 09:01) (127/49 - 132/78)  BP(mean): --  RR: 19 (29 Jun 2021 09:01) (19 - 20)  SpO2: 96% (29 Jun 2021 09:01) (96% - 97%)    Constitutional: frail looking  HEENT: NC/AT, EOMI, PERRLA  Neck: supple  Back: no tenderness  Respiratory: clear with diminished breath sounds at bases  Cardiovascular: S1S2 regular, no murmurs  Abdomen: soft, not tender, not distended, positive BS  Genitourinary: deferred  Rectal: deferred  Musculoskeletal: no muscle tenderness, no joint swelling or tenderness  Extremities: no pedal edema  Neurological: AxO, no focal deficits  Skin: no rashes      CBC Full  -  ( 29 Jun 2021 10:49 )  WBC Count : 5.54 K/uL  RBC Count : 2.70 M/uL  Hemoglobin : 9.7 g/dL  Hematocrit : 28.4 %  Platelet Count - Automated : 83 K/uL  Mean Cell Volume : 105.2 fl  Mean Cell Hemoglobin : 35.9 pg  Mean Cell Hemoglobin Concentration : 34.2 gm/dL  Auto Neutrophil # : x  Auto Lymphocyte # : x  Auto Monocyte # : x  Auto Eosinophil # : x  Auto Basophil # : x  Auto Neutrophil % : x  Auto Lymphocyte % : x  Auto Monocyte % : x  Auto Eosinophil % : x  Auto Basophil % : x    06-29    145  |  114<H>  |  11  ----------------------------<  103<H>  2.9<LL>   |  25  |  0.47<L>    Ca    7.6<L>      29 Jun 2021 10:49  Phos  2.2     06-29  Mg     1.5     06-29      < from: Xray Chest 1 View-PORTABLE IMMEDIATE (Xray Chest 1 View-PORTABLE IMMEDIATE .) (06.28.21 @ 11:50) >  IMPRESSION: Infiltrates developingparticularly at left base.    < end of copied text >      Advanced directive: DNR     89 y/o F with PMHx of severe  dementia, breast CA s/p L mastectomy, HTN, OA, s/p TKR, s/p hysterectomy, s/p hernia repair, and s/p cholecystectomy admitted on 6/24 from snf from [facility] for eval of +vomiting today on the day of admission. History per medical record as patient unable to provide history.     PMH/SH as above  Fam History : unable to obtain secondary to patient medical condition   NKDA  Soc history; no tobacco, alcohol    MEDICATIONS  (STANDING):  pantoprazole  Injectable 40 milliGRAM(s) IV Push daily  piperacillin/tazobactam IVPB.. 3.375 Gram(s) IV Intermittent every 8 hours    MEDICATIONS  (PRN):  acetaminophen  Suppository .. 650 milliGRAM(s) Rectal every 6 hours PRN Temp greater or equal to 38C (100.4F), Mild Pain (1 - 3)  aluminum hydroxide/magnesium hydroxide/simethicone Suspension 30 milliLiter(s) Oral every 4 hours PRN Dyspepsia  morphine   Solution 5 milliGRAM(s) Oral every 4 hours PRN Severe Pain (7 - 10)dyspnea  morphine  - Injectable 3 milliGRAM(s) IV Push every 3 hours PRN Severe Pain (7 - 10)  ondansetron Injectable 4 milliGRAM(s) IV Push every 6 hours PRN Nausea  QUEtiapine 12.5 milliGRAM(s) Oral every 12 hours PRN agitation      Vital Signs Last 24 Hrs  T(C): 37.2 (29 Jun 2021 09:01), Max: 37.9 (29 Jun 2021 02:09)  T(F): 99 (29 Jun 2021 09:01), Max: 100.3 (29 Jun 2021 02:09)  HR: 69 (29 Jun 2021 09:01) (69 - 98)  BP: 127/49 (29 Jun 2021 09:01) (127/49 - 132/78)  BP(mean): --  RR: 19 (29 Jun 2021 09:01) (19 - 20)  SpO2: 96% (29 Jun 2021 09:01) (96% - 97%)    Constitutional: frail looking  HEENT: NC/AT, EOMI, PERRLA  Neck: supple  Back: no tenderness  Respiratory: clear with diminished breath sounds at bases  Cardiovascular: S1S2 regular, no murmurs  Abdomen: soft, not tender, not distended, positive BS  Genitourinary: deferred  Rectal: deferred  Musculoskeletal: no muscle tenderness, no joint swelling or tenderness  Extremities: no pedal edema  Neurological: AxO, no focal deficits  Skin: no rashes      CBC Full  -  ( 29 Jun 2021 10:49 )  WBC Count : 5.54 K/uL  RBC Count : 2.70 M/uL  Hemoglobin : 9.7 g/dL  Hematocrit : 28.4 %  Platelet Count - Automated : 83 K/uL  Mean Cell Volume : 105.2 fl  Mean Cell Hemoglobin : 35.9 pg  Mean Cell Hemoglobin Concentration : 34.2 gm/dL  Auto Neutrophil # : x  Auto Lymphocyte # : x  Auto Monocyte # : x  Auto Eosinophil # : x  Auto Basophil # : x  Auto Neutrophil % : x  Auto Lymphocyte % : x  Auto Monocyte % : x  Auto Eosinophil % : x  Auto Basophil % : x    06-29    145  |  114<H>  |  11  ----------------------------<  103<H>  2.9<LL>   |  25  |  0.47<L>    Ca    7.6<L>      29 Jun 2021 10:49  Phos  2.2     06-29  Mg     1.5     06-29      < from: Xray Chest 1 View-PORTABLE IMMEDIATE (Xray Chest 1 View-PORTABLE IMMEDIATE .) (06.28.21 @ 11:50) >  IMPRESSION: Infiltrates developingparticularly at left base.    < end of copied text >      Advanced directive: DNR

## 2021-06-29 NOTE — PROGRESS NOTE ADULT - ASSESSMENT
#SBO  conservative management  s/p NGT on suction - patient pulled out the NGT  Surgery consult appreciated   cS/P IV fluids   IV PPI  repeat ABd Xray - resolving SBO  Advance to mechanical soft diet     #Fever 2ndry to Aspiration/GNR PNA  continue Zosyn#2  FU cultures  ID consult appreciated   Swallow evaluation appreciated - Mechanical soft diet   Nutriotion consult     #Hypokalemia/Hypophosphatemia  -replete IV and monitor     #Dementia  Palliative care consult appreciated     #Code Status DNR/DNI; Home with Home hospice     Dispo - patient here with N/V found to have SBO pulled out nGT tube; tolerating diet; then spiked a fever found to have aspiration PNA; After 2-3 days of OV ABX plan is to go back to [facility] with hospice; palliative following  #SBO  conservative management  s/p NGT on suction - patient pulled out the NGT  Surgery consult appreciated   cS/P IV fluids   IV PPI  repeat ABd Xray - resolving SBO  Advance to mechanical soft diet     #Fever 2ndry to Aspiration/GNR PNA  continue Zosyn#2  FU cultures  ID consult appreciated   Swallow evaluation appreciated - Mechanical soft diet   Nutriotion consult     #RUE Swelling R/O DVT or fracture  -FU Doppler of RUE and Xrays of elbow and shoulders    #Hypokalemia/Hypophosphatemia  -replete IV and monitor     #Dementia  Palliative care consult appreciated     #Code Status DNR/DNI; Home with Home hospice     Dispo - patient here with N/V found to have SBO pulled out nGT tube; tolerating diet; then spiked a fever found to have aspiration PNA; After 2-3 days of OV ABX plan is to go back to [facility] with hospice; palliative following; FU Doppler and xrays of RUE

## 2021-06-29 NOTE — PROGRESS NOTE ADULT - NUTRITIONAL ASSESSMENT
This patient has been assessed with a concern for Malnutrition and has been determined to have a diagnosis/diagnoses of Severe protein-calorie malnutrition.    This patient is being managed with:   Diet Clear Liquid-  Entered: Jun 26 2021 11:50AM    
This patient has been assessed with a concern for Malnutrition and has been determined to have a diagnosis/diagnoses of Severe protein-calorie malnutrition.    This patient is being managed with:   Diet Mechanical Soft-  Entered: Jun 27 2021 12:46PM    
This patient has been assessed with a concern for Malnutrition and has been determined to have a diagnosis/diagnoses of Severe protein-calorie malnutrition.    This patient is being managed with:   Diet Clear Liquid-  Entered: Jun 26 2021 11:50AM    
This patient has been assessed with a concern for Malnutrition and has been determined to have a diagnosis/diagnoses of Severe protein-calorie malnutrition.    This patient is being managed with:   Diet Mechanical Soft-  Entered: Jun 27 2021 12:46PM    
This patient has been assessed with a concern for Malnutrition and has been determined to have a diagnosis/diagnoses of Severe protein-calorie malnutrition.    This patient is being managed with:   Diet NPO-  Except Medications  With Ice Chips/Sips of Water  Entered: Jun 24 2021  3:30PM

## 2021-06-29 NOTE — PHYSICAL THERAPY INITIAL EVALUATION ADULT - DISCHARGE DISPOSITION, PT EVAL
DC from PT services, Pt is unable to participate in PT, unable to follow commands, DC plan to LTC with comfort care/hospice/extended care facility/no skilled PT needs

## 2021-06-29 NOTE — CONSULT NOTE ADULT - ASSESSMENT
HPI: Pt is a 89y old Female with a PMHx of severe  dementia, breast CA s/p L mastectomy, HTN, OA, s/p TKR, s/p hysterectomy, s/p hernia repair, and s/p cholecystectomy presents to the ED from [facility] for eval of +vomiting today which staff reports was black in color. Daughter at bedside notes pt was screaming in pain throughout the day yesterday from +abd pain.  Pt is poor historian 2/2 baseline severe dementia: non verbal, not recognizing family members. Daughter present DNR, due to the pain I explained options and she decided GI should be consulted if procedure will alleviate pain. Pt is uncomfortable, in pain. Palliative medicine Consult to further establish GOC  6/25/21 Seen and examined at bedside with no family present. Restless and disoriented. Grimacing intermittently. Unable to answer questions.     Assessment and Plan:    1) SBO  -CT abd/pelvis noted  -Non verbal signs of abd pain  -NG to cont suction in place  -Cont Morphine 3 mg IVP Q3H PRN severe pain  -Surgical eval pending    2) Pain  -R/T SBO  -Cont Morphine as above  -Supportive care    3) Debility  -Advanced dementia  -FAST 7C  -Poor PO intake PTA  -NPO  -Severe protein brigida malnutrition  -Immobility  -Restraints for safety    4) Advanced Directives  -Pt without capacity  -Daughter Katherin Pruitt  -BOWEN DNR/DNI   -Will schedule GOC discussion  
89 y/o F with PMHx of severe  dementia, breast CA s/p L mastectomy, HTN, OA, s/p TKR, s/p hysterectomy, s/p hernia repair, and s/p cholecystectomy admitted on 6/24 from snf from [facility] for eval of +vomiting today on the day of admission. History per medical record as patient unable to provide history.   1. Patient admitted with aspiration pneumonia after vomiting, also with SBO which has resolved  - follow up cultures   - serial cbc and monitor temperature   - reviewed prior medical records to evaluate for resistant or atypical pathogens   - diet per s/s  - iv hydration and supportive care   - can continue zosyn as ordered  - palliative care evaluation in progress, can stop iv antibiotics when ready for discharge; possibly with hospice  2. other issues; per medicine
SBO likely secondary to adhesions from prior surgery. Continue NGT to suction. Follow up X Rays. If no improvement, surgery can be offered if family agreeable.

## 2021-06-29 NOTE — DISCHARGE NOTE NURSING/CASE MANAGEMENT/SOCIAL WORK - PATIENT PORTAL LINK FT
You can access the FollowMyHealth Patient Portal offered by [facility] by registering at the following website: [email or web address]. By joining [facility]’s FollowMyHealth portal, you will also be able to view your health information using other applications (apps) compatible with our system.

## 2021-06-29 NOTE — PHYSICAL THERAPY INITIAL EVALUATION ADULT - IMPAIRMENTS FOUND, PT EVAL
arousal, attention, and cognition/cognitive impairment/fine motor/gait, locomotion, and balance/gross motor/joint integrity and mobility/muscle strength/ROM

## 2021-06-29 NOTE — PHYSICAL THERAPY INITIAL EVALUATION ADULT - PERTINENT HX OF CURRENT PROBLEM, REHAB EVAL
Pt presents to the ED from [facility] for eval of +vomiting today which staff reports was black in color. pt was screaming in pain throughout the day yesterday from +abd pain.  Pt is poor historian 2/2 baseline severe dementia: non verbal, not recognizing family members.,pt was s/p NGT on suction but pt pulled out NGT.

## 2021-06-29 NOTE — PHYSICAL THERAPY INITIAL EVALUATION ADULT - RANGE OF MOTION EXAMINATION, REHAB EVAL
BLE in Hip-knee flexion with left hip in ext rot and Rt hip in int rotation., limited ranges, LUE NT painful to touch/deficits as listed below

## 2021-06-30 ENCOUNTER — TRANSCRIPTION ENCOUNTER (OUTPATIENT)
Age: 86
End: 2021-06-30

## 2021-06-30 VITALS
OXYGEN SATURATION: 97 % | RESPIRATION RATE: 18 BRPM | TEMPERATURE: 97 F | SYSTOLIC BLOOD PRESSURE: 118 MMHG | HEART RATE: 83 BPM | DIASTOLIC BLOOD PRESSURE: 55 MMHG

## 2021-06-30 LAB
ANION GAP SERPL CALC-SCNC: 6 MMOL/L — SIGNIFICANT CHANGE UP (ref 5–17)
BUN SERPL-MCNC: 10 MG/DL — SIGNIFICANT CHANGE UP (ref 7–23)
CALCIUM SERPL-MCNC: 7.5 MG/DL — LOW (ref 8.5–10.1)
CHLORIDE SERPL-SCNC: 112 MMOL/L — HIGH (ref 96–108)
CO2 SERPL-SCNC: 25 MMOL/L — SIGNIFICANT CHANGE UP (ref 22–31)
CREAT SERPL-MCNC: 0.38 MG/DL — LOW (ref 0.5–1.3)
GLUCOSE SERPL-MCNC: 71 MG/DL — SIGNIFICANT CHANGE UP (ref 70–99)
MAGNESIUM SERPL-MCNC: 1.5 MG/DL — LOW (ref 1.6–2.6)
PHOSPHATE SERPL-MCNC: 3 MG/DL — SIGNIFICANT CHANGE UP (ref 2.5–4.5)
PLATELET # BLD AUTO: 91 K/UL — LOW (ref 150–400)
POTASSIUM SERPL-MCNC: 3.4 MMOL/L — LOW (ref 3.5–5.3)
POTASSIUM SERPL-SCNC: 3.4 MMOL/L — LOW (ref 3.5–5.3)
SARS-COV-2 RNA SPEC QL NAA+PROBE: SIGNIFICANT CHANGE UP
SODIUM SERPL-SCNC: 143 MMOL/L — SIGNIFICANT CHANGE UP (ref 135–145)

## 2021-06-30 PROCEDURE — 93971 EXTREMITY STUDY: CPT | Mod: 26,RT

## 2021-06-30 PROCEDURE — 99239 HOSP IP/OBS DSCHRG MGMT >30: CPT

## 2021-06-30 RX ORDER — ALPRAZOLAM 0.25 MG
0.25 TABLET ORAL ONCE
Refills: 0 | Status: DISCONTINUED | OUTPATIENT
Start: 2021-06-30 | End: 2021-06-30

## 2021-06-30 RX ORDER — POTASSIUM CHLORIDE 20 MEQ
40 PACKET (EA) ORAL ONCE
Refills: 0 | Status: DISCONTINUED | OUTPATIENT
Start: 2021-06-30 | End: 2021-06-30

## 2021-06-30 RX ORDER — QUETIAPINE FUMARATE 200 MG/1
12.5 TABLET, FILM COATED ORAL
Qty: 0 | Refills: 0 | DISCHARGE
Start: 2021-06-30

## 2021-06-30 RX ADMIN — PIPERACILLIN AND TAZOBACTAM 25 GRAM(S): 4; .5 INJECTION, POWDER, LYOPHILIZED, FOR SOLUTION INTRAVENOUS at 09:26

## 2021-06-30 RX ADMIN — QUETIAPINE FUMARATE 12.5 MILLIGRAM(S): 200 TABLET, FILM COATED ORAL at 16:56

## 2021-06-30 RX ADMIN — PIPERACILLIN AND TAZOBACTAM 25 GRAM(S): 4; .5 INJECTION, POWDER, LYOPHILIZED, FOR SOLUTION INTRAVENOUS at 01:54

## 2021-06-30 RX ADMIN — PANTOPRAZOLE SODIUM 40 MILLIGRAM(S): 20 TABLET, DELAYED RELEASE ORAL at 09:27

## 2021-06-30 NOTE — PROGRESS NOTE ADULT - SUBJECTIVE AND OBJECTIVE BOX
History of Present Illness:      89 y/o F with PMHx of severe  dementia, breast CA s/p L mastectomy, HTN, OA, s/p TKR, s/p hysterectomy, s/p hernia repair, and s/p cholecystectomy presents to the ED [facility] from [facility] for eval of +vomiting today. Staff reports emesis was black in color. Daughter at bedside notes pt was screaming in pain throughout the day yesterday from +abd pain. No fever. NKDA. Pt is poor historian 2/2 baseline severe dementia: non verbal, not recognizing family members. Daughter present DNR, due to the pain I explained options and she decided GI should be consulted if procedure will alleviate Mum's pain. Pt is uncomfortable, in pain.   -found to have SBO, s/p NGT    6.25: confused            REVIEW OF SYSTEMS:  unable to obtain due to confusion     All other review of systems is negative unless indicated above    Vital Signs Last 24 Hrs  T(C): 37.2 (25 Jun 2021 09:00), Max: 38.1 (24 Jun 2021 22:25)  T(F): 98.9 (25 Jun 2021 09:00), Max: 100.5 (24 Jun 2021 22:25)  HR: 71 (25 Jun 2021 12:38) (60 - 118)  BP: 129/61 (25 Jun 2021 12:38) (104/36 - 144/84)  BP(mean): 73 (24 Jun 2021 15:15) (73 - 73)  RR: 14 (25 Jun 2021 09:00) (14 - 18)  SpO2: 98% (25 Jun 2021 09:00) (97% - 99%)    PHYSICAL EXAM:    GENERAL: NAD, Well nourished  HEENT:  NC/AT, EOMI, PERRLA, No scleral icterus, Moist mucous membranes, +NGT  NECK: Supple, No JVD  CNS:  Alert & Oriented X0, Motor Strength 5/5 B/L upper and lower extremities; DTRs 2+ intact   LUNG: Normal Breath sounds, Clear to auscultation bilaterally, No rales, No rhonchi, No wheezing  HEART: RRR; +4/6 LSB murmurs, No rubs  ABDOMEN: +BS, ST/ND/NT  GENITOURINARY: Voiding, Bladder not distended  EXTREMITIES:  2+ Peripheral Pulses, No clubbing, No cyanosis, No tibial edema  MUSCULOSKELTAL: Joints normal ROM, No TTP, No effusion  VAGINAL: deferred  SKIN: no rashes  RECTAL: deferred, not indicated  BREAST: deferred                          11.3   9.83  )-----------( 113      ( 25 Jun 2021 07:53 )             34.6     06-24    140  |  107  |  23  ----------------------------<  136<H>  4.0   |  28  |  0.69    Ca    9.1      24 Jun 2021 13:18    TPro  7.1  /  Alb  3.4  /  TBili  1.1  /  DBili  x   /  AST  32  /  ALT  29  /  AlkPhos  83  06-24    Vancomycin levels:   Cultures:     MEDICATIONS  (STANDING):  dextrose 5% + sodium chloride 0.9%. 1000 milliLiter(s) (100 mL/Hr) IV Continuous <Continuous>  pantoprazole  Injectable 40 milliGRAM(s) IV Push daily    MEDICATIONS  (PRN):  acetaminophen  Suppository .. 650 milliGRAM(s) Rectal every 6 hours PRN Temp greater or equal to 38C (100.4F), Mild Pain (1 - 3)  aluminum hydroxide/magnesium hydroxide/simethicone Suspension 30 milliLiter(s) Oral every 4 hours PRN Dyspepsia  morphine  - Injectable 3 milliGRAM(s) IV Push every 3 hours PRN Severe Pain (7 - 10)  ondansetron Injectable 4 milliGRAM(s) IV Push every 6 hours PRN Nausea      all labs reviewed  all imaging reviewed    a/p:    1. SBO  conservative management  s/p NGT on suction  NPO  c/w IV fluids   repeat electrolytes   IV PPI    2. Dementia    Palliative care meeting for [facility]  DNR/DNI
HOSPITALIST PROGRESS NOTE:  SUBJECTIVE:  PCP:  Chief Complaint: Patient is a 89y old  Female who presents with a chief complaint of abd pain (26 Jun 2021 11:50)      HPI:     89 y/o F with PMHx of severe  dementia, breast CA s/p L mastectomy, HTN, OA, s/p TKR, s/p hysterectomy, s/p hernia repair, and s/p cholecystectomy presents to the ED [facility]EM[facility] for eval of +vomiting today. Staff reports emesis was black in color. Daughter at bedside notes pt was screaming in pain throughout the day yesterday from +abd pain. No fever. NKDA. Pt is poor historian 2/2 baseline severe dementia: non verbal, not recognizing family members. Daughter present DNR, due to the pain I explained options and she decided GI should be consulted if procedure will alleviate Mum's pain. Pt is uncomfortable, in pain.    (24 Jun 2021 15:34)    6/26:  Above reviewed; patient pulled out NGT multiple times overnight; No N/V or abdominal pain  6/27:  tolerated Liquids; Patient confused and screaming out   6/28: Overnight patient had a fever of 101; CXR ordered by me shows a pna; discussed with patients daughter who is fine with antibiotics and swallow evaluation      Allergies:  No Known Allergies    REVIEW OF SYSTEMS:  See HPI. All other review of systems is negative unless indicated above.     OBJECTIVE  Physical Exam:  Vital Signs Last 24 Hrs  T(C): 37.6 (28 Jun 2021 12:33), Max: 38.4 (28 Jun 2021 10:23)  T(F): 99.6 (28 Jun 2021 12:33), Max: 101.1 (28 Jun 2021 10:23)  HR: 93 (28 Jun 2021 09:18) (88 - 134)  BP: 142/96 (28 Jun 2021 09:18) (105/77 - 168/96)  BP(mean): --  RR: 20 (28 Jun 2021 08:30) (19 - 20)  SpO2: 95% (28 Jun 2021 08:30) (94% - 97%)    Constitutional: NAD, malnourished   Neurological:  confused no focal deficits  HEENT: PERRLA, EOMI, MMM  Neck: Soft and supple, No LAD, No JVD  Respiratory: Breath sounds are clear bilaterally, No wheezing, rales or rhonchi  Cardiovascular: S1 and S2, regular rate and rhythm; no Murmurs, gallops or rubs  Gastrointestinal: Bowel Sounds present, soft, nontender, nondistended, no guarding, no rebound tenderness  Back: No CVA tenderness   Extremities: No peripheral edema  Vascular: 2+ peripheral pulses  Musculoskeletal: 5/5 strength b/l upper and lower extremities  Skin: No rashes  Breast: Deferred  Rectal: Deferred    MEDICATIONS  (STANDING):  dextrose 5% + sodium chloride 0.9%. 1000 milliLiter(s) (100 mL/Hr) IV Continuous <Continuous>  pantoprazole  Injectable 40 milliGRAM(s) IV Push daily    Lab Results:  CBC  CBC Full  -  ( 28 Jun 2021 10:51 )  WBC Count : 6.36 K/uL  RBC Count : 2.91 M/uL  Hemoglobin : 10.1 g/dL  Hematocrit : 30.9 %  Platelet Count - Automated : 89 K/uL  Mean Cell Volume : 106.2 fl  Mean Cell Hemoglobin : 34.7 pg  Mean Cell Hemoglobin Concentration : 32.7 gm/dL  Auto Neutrophil # : x  Auto Lymphocyte # : x  Auto Monocyte # : x  Auto Eosinophil # : x  Auto Basophil # : x  Auto Neutrophil % : x  Auto Lymphocyte % : x  Auto Monocyte % : x  Auto Eosinophil % : x  Auto Basophil % : x    .		Differential:	[] Automated		[] Manual  Chemistry                        10.1   6.36  )-----------( 89       ( 28 Jun 2021 10:51 )             30.9     06-28    145  |  115<H>  |  8   ----------------------------<  127<H>  2.5<LL>   |  27  |  0.49<L>    Ca    7.6<L>      28 Jun 2021 10:51  Phos  1.6     06-28  Mg     1.6     06-28      RADIOLOGY/EKG:    < from: Xray Abdomen 1 View PORTABLE -Routine (Xray Abdomen 1 View PORTABLE -Routine .) (06.24.21 @ 22:40) >    IMPRESSION:  NG tube to stomach.    Thank you for this referral.    < end of copied text >  < from: Xray Chest 1 View-PORTABLE IMMEDIATE (Xray Chest 1 View-PORTABLE IMMEDIATE .) (06.24.21 @ 14:46) >    IMPRESSION:    No acute pulmonary disease      < end of copied text >      < from: CT Abdomen and Pelvis w/ IV Cont (06.24.21 @ 14:56) >    IMPRESSION: Moderately dilated proximal and mid small bowel loops with abrupt transition in the mid abdomen to collapsed mid and distal small bowel loops best visualized on image 54 of series 3 and image 39 of the coronal reconstructions. These findings are compatible with a moderate to high grade small bowel obstruction likely due to an adhesive band. Recommend plain film follow-up.    < end of copied text >    < from: Xray Abdomen 2 Views (06.26.21 @ 09:50) >    IMPRESSION:    Overall less air distention of small bowel without air-fluid levels.    < end of copied text >    
HOSPITALIST PROGRESS NOTE:  SUBJECTIVE:  PCP:  Chief Complaint: Patient is a 89y old  Female who presents with a chief complaint of abd pain (26 Jun 2021 11:50)      HPI:     89 y/o F with PMHx of severe  dementia, breast CA s/p L mastectomy, HTN, OA, s/p TKR, s/p hysterectomy, s/p hernia repair, and s/p cholecystectomy presents [facility] ED [facility] +[facility]omiting today. Staff reports emesis was black in color. Daughter at bedside notes pt was screaming in pain throughout the day yesterday from +abd pain. No fever. NKDA. Pt is poor historian 2/2 baseline severe dementia: non verbal, not recognizing family members. Daughter present DNR, due to the pain I explained options and she decided GI should be consulted if procedure will alleviate Mum's pain. Pt is uncomfortable, in pain.    (24 Jun 2021 15:34)    6/26:  Above reviewed; patient pulled out NGT multiple times overnight; No N/V or abdominal pain  6/27:  tolerated Liquids; Patient confused and screaming out       Allergies:  No Known Allergies    REVIEW OF SYSTEMS:  See HPI. All other review of systems is negative unless indicated above.     OBJECTIVE  Physical Exam:  Vital Signs Last 24 Hrs  T(C): 37.2 (27 Jun 2021 08:43), Max: 37.7 (26 Jun 2021 23:48)  T(F): 99 (27 Jun 2021 08:43), Max: 99.8 (26 Jun 2021 23:48)  HR: 89 (27 Jun 2021 08:43) (66 - 105)  BP: 125/74 (27 Jun 2021 08:43) (91/72 - 138/89)  BP(mean): --  RR: 20 (27 Jun 2021 08:43) (17 - 22)  SpO2: 96% (27 Jun 2021 08:43) (94% - 96%)    Constitutional: NAD, malnourished   Neurological:  confused no focal deficits  HEENT: PERRLA, EOMI, MMM  Neck: Soft and supple, No LAD, No JVD  Respiratory: Breath sounds are clear bilaterally, No wheezing, rales or rhonchi  Cardiovascular: S1 and S2, regular rate and rhythm; no Murmurs, gallops or rubs  Gastrointestinal: Bowel Sounds present, soft, nontender, nondistended, no guarding, no rebound tenderness  Back: No CVA tenderness   Extremities: No peripheral edema  Vascular: 2+ peripheral pulses  Musculoskeletal: 5/5 strength b/l upper and lower extremities  Skin: No rashes  Breast: Deferred  Rectal: Deferred    MEDICATIONS  (STANDING):  dextrose 5% + sodium chloride 0.9%. 1000 milliLiter(s) (100 mL/Hr) IV Continuous <Continuous>  pantoprazole  Injectable 40 milliGRAM(s) IV Push daily      LABS: All Labs Reviewed:                        11.3   9.83  )-----------( 113      ( 25 Jun 2021 07:53 )             34.6     06-24    140  |  107  |  23  ----------------------------<  136<H>  4.0   |  28  |  0.69    Ca    9.1      24 Jun 2021 13:18    TPro  7.1  /  Alb  3.4  /  TBili  1.1  /  DBili  x   /  AST  32  /  ALT  29  /  AlkPhos  83  06-24      RADIOLOGY/EKG:    < from: Xray Abdomen 1 View PORTABLE -Routine (Xray Abdomen 1 View PORTABLE -Routine .) (06.24.21 @ 22:40) >    IMPRESSION:  NG tube to stomach.    Thank you for this referral.    < end of copied text >  < from: Xray Chest 1 View-PORTABLE IMMEDIATE (Xray Chest 1 View-PORTABLE IMMEDIATE .) (06.24.21 @ 14:46) >    IMPRESSION:    No acute pulmonary disease      < end of copied text >      < from: CT Abdomen and Pelvis w/ IV Cont (06.24.21 @ 14:56) >    IMPRESSION: Moderately dilated proximal and mid small bowel loops with abrupt transition in the mid abdomen to collapsed mid and distal small bowel loops best visualized on image 54 of series 3 and image 39 of the coronal reconstructions. These findings are compatible with a moderate to high grade small bowel obstruction likely due to an adhesive band. Recommend plain film follow-up.    < end of copied text >    < from: Xray Abdomen 2 Views (06.26.21 @ 09:50) >    IMPRESSION:    Overall less air distention of small bowel without air-fluid levels.    < end of copied text >    
HOSPITALIST PROGRESS NOTE:  SUBJECTIVE:  PCP:  Chief Complaint: Patient is a 89y old  Female who presents with a chief complaint of abd pain (26 Jun 2021 11:50)      HPI:     89 y/o F with PMHx of severe  dementia, breast CA s/p L mastectomy, HTN, OA, s/p TKR, s/p hysterectomy, s/p hernia repair, and s/p cholecystectomy presents [facility] the ED [facility] for eval of +vomiting today. Staff reports emesis was black in color. Daughter at bedside notes pt was screaming in pain throughout the day yesterday from +abd pain. No fever. NKDA. Pt is poor historian 2/2 baseline severe dementia: non verbal, not recognizing family members. Daughter present DNR, due to the pain I explained options and she decided GI should be consulted if procedure will alleviate Mum's pain. Pt is uncomfortable, in pain.    (24 Jun 2021 15:34)    6/26:  Above reviewed; patient pulled out NGT multiple times overnight; No N/V or abdominal pain      Allergies:  No Known Allergies    REVIEW OF SYSTEMS:  See HPI. All other review of systems is negative unless indicated above.     OBJECTIVE  Physical Exam:  Vital Signs Last 24 Hrs  T(C): 36.8 (25 Jun 2021 23:31), Max: 36.8 (25 Jun 2021 23:31)  T(F): 98.2 (25 Jun 2021 23:31), Max: 98.2 (25 Jun 2021 23:31)  HR: 107 (25 Jun 2021 23:31) (67 - 107)  BP: 131/75 (25 Jun 2021 23:31) (129/61 - 132/59)  BP(mean): --  RR: 18 (25 Jun 2021 23:31) (17 - 18)  SpO2: 96% (25 Jun 2021 23:31) (96% - 97%)      Constitutional: NAD,   Neurological:  no focal deficits  HEENT: PERRLA, EOMI, MMM  Neck: Soft and supple, No LAD, No JVD  Respiratory: Breath sounds are clear bilaterally, No wheezing, rales or rhonchi  Cardiovascular: S1 and S2, regular rate and rhythm; no Murmurs, gallops or rubs  Gastrointestinal: Bowel Sounds present, soft, nontender, nondistended, no guarding, no rebound tenderness  Back: No CVA tenderness   Extremities: No peripheral edema  Vascular: 2+ peripheral pulses  Musculoskeletal: 5/5 strength b/l upper and lower extremities  Skin: No rashes  Breast: Deferred  Rectal: Deferred    MEDICATIONS  (STANDING):  dextrose 5% + sodium chloride 0.9%. 1000 milliLiter(s) (100 mL/Hr) IV Continuous <Continuous>  pantoprazole  Injectable 40 milliGRAM(s) IV Push daily      LABS: All Labs Reviewed:                        11.3   9.83  )-----------( 113      ( 25 Jun 2021 07:53 )             34.6     06-24    140  |  107  |  23  ----------------------------<  136<H>  4.0   |  28  |  0.69    Ca    9.1      24 Jun 2021 13:18    TPro  7.1  /  Alb  3.4  /  TBili  1.1  /  DBili  x   /  AST  32  /  ALT  29  /  AlkPhos  83  06-24      RADIOLOGY/EKG:    < from: Xray Abdomen 1 View PORTABLE -Routine (Xray Abdomen 1 View PORTABLE -Routine .) (06.24.21 @ 22:40) >    IMPRESSION:  NG tube to stomach.    Thank you for this referral.    < end of copied text >  < from: Xray Chest 1 View-PORTABLE IMMEDIATE (Xray Chest 1 View-PORTABLE IMMEDIATE .) (06.24.21 @ 14:46) >    IMPRESSION:    No acute pulmonary disease      < end of copied text >      < from: CT Abdomen and Pelvis w/ IV Cont (06.24.21 @ 14:56) >    IMPRESSION: Moderately dilated proximal and mid small bowel loops with abrupt transition in the mid abdomen to collapsed mid and distal small bowel loops best visualized on image 54 of series 3 and image 39 of the coronal reconstructions. These findings are compatible with a moderate to high grade small bowel obstruction likely due to an adhesive band. Recommend plain film follow-up.    < end of copied text >    
HOSPITALIST PROGRESS NOTE:  SUBJECTIVE:  PCP:  Chief Complaint: Patient is a 89y old  Female who presents with a chief complaint of abd pain (26 Jun 2021 11:50)      HPI:     89 y/o F with PMHx of severe  dementia, breast CA s/p L mastectomy, HTN, OA, s/p TKR, s/p hysterectomy, s/p hernia repair, and s/p cholecystectomy presents to the ED [facility] for eval of +vomiting today. Staff reports emesis was black in color. Daughter at bedside notes pt was screaming in pain throughout the day yesterday from +abd pain. No fever. NKDA. Pt is poor historian 2/2 baseline severe dementia: non verbal, not recognizing family members. Daughter present DNR, due to the pain I explained options and she decided GI should be consulted if procedure will alleviate Mum's pain. Pt is uncomfortable, in pain.    (24 Jun 2021 15:34)    6/26:  Above reviewed; patient pulled out NGT multiple times overnight; No N/V or abdominal pain  6/27:  tolerated Liquids; Patient confused and screaming out   6/28: Overnight patient had a fever of 101; CXR ordered by me shows a pna; discussed with patients daughter who is fine with antibiotics and swallow evaluation  6/29: No further fever; RUE swollen;       Allergies:  No Known Allergies    REVIEW OF SYSTEMS:  See HPI. All other review of systems is negative unless indicated above.     OBJECTIVE  Physical Exam:  Vital Signs Last 24 Hrs  T(C): 37.2 (29 Jun 2021 09:01), Max: 37.9 (29 Jun 2021 02:09)  T(F): 99 (29 Jun 2021 09:01), Max: 100.3 (29 Jun 2021 02:09)  HR: 69 (29 Jun 2021 09:01) (69 - 98)  BP: 127/49 (29 Jun 2021 09:01) (127/49 - 132/70)  BP(mean): --  RR: 19 (29 Jun 2021 09:01) (19 - 19)  SpO2: 96% (29 Jun 2021 09:01) (96% - 96%)    Constitutional: NAD, malnourished   Neurological:  confused no focal deficits  HEENT: PERRLA, EOMI, MMM  Neck: Soft and supple, No LAD, No JVD  Respiratory: Breath sounds are clear bilaterally, No wheezing, rales or rhonchi  Cardiovascular: S1 and S2, regular rate and rhythm; no Murmurs, gallops or rubs  Gastrointestinal: Bowel Sounds present, soft, nontender, nondistended, no guarding, no rebound tenderness  Back: No CVA tenderness   Extremities: No peripheral edema  Vascular: 2+ peripheral pulses  Musculoskeletal: 5/5 strength b/l upper and lower extremities  Skin: No rashes  Breast: Deferred  Rectal: Deferred    MEDICATIONS  (STANDING):  dextrose 5% + sodium chloride 0.9%. 1000 milliLiter(s) (100 mL/Hr) IV Continuous <Continuous>  pantoprazole  Injectable 40 milliGRAM(s) IV Push daily    Lab Results:  CBC  CBC Full  -  ( 29 Jun 2021 10:49 )  WBC Count : 5.54 K/uL  RBC Count : 2.70 M/uL  Hemoglobin : 9.7 g/dL  Hematocrit : 28.4 %  Platelet Count - Automated : 83 K/uL  Mean Cell Volume : 105.2 fl  Mean Cell Hemoglobin : 35.9 pg  Mean Cell Hemoglobin Concentration : 34.2 gm/dL  Auto Neutrophil # : x  Auto Lymphocyte # : x  Auto Monocyte # : x  Auto Eosinophil # : x  Auto Basophil # : x  Auto Neutrophil % : x  Auto Lymphocyte % : x  Auto Monocyte % : x  Auto Eosinophil % : x  Auto Basophil % : x    .		Differential:	[] Automated		[] Manual  Chemistry                        9.7    5.54  )-----------( 83       ( 29 Jun 2021 10:49 )             28.4     06-29    145  |  114<H>  |  11  ----------------------------<  103<H>  2.9<LL>   |  25  |  0.47<L>    Ca    7.6<L>      29 Jun 2021 10:49  Phos  2.2     06-29  Mg     1.5     06-29      RADIOLOGY/EKG:    < from: Xray Abdomen 1 View PORTABLE -Routine (Xray Abdomen 1 View PORTABLE -Routine .) (06.24.21 @ 22:40) >    IMPRESSION:  NG tube to stomach.    Thank you for this referral.    < end of copied text >  < from: Xray Chest 1 View-PORTABLE IMMEDIATE (Xray Chest 1 View-PORTABLE IMMEDIATE .) (06.24.21 @ 14:46) >    IMPRESSION:    No acute pulmonary disease      < end of copied text >      < from: CT Abdomen and Pelvis w/ IV Cont (06.24.21 @ 14:56) >    IMPRESSION: Moderately dilated proximal and mid small bowel loops with abrupt transition in the mid abdomen to collapsed mid and distal small bowel loops best visualized on image 54 of series 3 and image 39 of the coronal reconstructions. These findings are compatible with a moderate to high grade small bowel obstruction likely due to an adhesive band. Recommend plain film follow-up.    < end of copied text >    < from: Xray Abdomen 2 Views (06.26.21 @ 09:50) >    IMPRESSION:    Overall less air distention of small bowel without air-fluid levels.    < end of copied text >    
    Date of service: 06-30-21 @ 11:09      Patient lying in bed; nonverbal, afebrile, awake      ROS: unable to obtain secondary to patient medical condition     MEDICATIONS  (STANDING):  pantoprazole  Injectable 40 milliGRAM(s) IV Push daily  piperacillin/tazobactam IVPB.. 3.375 Gram(s) IV Intermittent every 8 hours    MEDICATIONS  (PRN):  acetaminophen  Suppository .. 650 milliGRAM(s) Rectal every 6 hours PRN Temp greater or equal to 38C (100.4F), Mild Pain (1 - 3)  aluminum hydroxide/magnesium hydroxide/simethicone Suspension 30 milliLiter(s) Oral every 4 hours PRN Dyspepsia  morphine   Solution 5 milliGRAM(s) Oral every 4 hours PRN Severe Pain (7 - 10)dyspnea  morphine  - Injectable 3 milliGRAM(s) IV Push every 3 hours PRN Severe Pain (7 - 10)  ondansetron Injectable 4 milliGRAM(s) IV Push every 6 hours PRN Nausea  QUEtiapine 12.5 milliGRAM(s) Oral every 12 hours PRN agitation      Vital Signs Last 24 Hrs  T(C): 37 (30 Jun 2021 08:44), Max: 37 (30 Jun 2021 00:24)  T(F): 98.6 (30 Jun 2021 08:44), Max: 98.6 (30 Jun 2021 00:24)  HR: 72 (30 Jun 2021 08:44) (72 - 106)  BP: 116/54 (30 Jun 2021 08:44) (116/54 - 152/75)  BP(mean): --  RR: 19 (30 Jun 2021 08:44) (18 - 19)  SpO2: 95% (30 Jun 2021 08:44) (95% - 99%)        Physical Exam:            Constitutional: frail looking  HEENT: NC/AT, EOMI, PERRLA  Neck: supple  Back: no tenderness  Respiratory: clear with diminished breath sounds at bases  Cardiovascular: S1S2 regular, no murmurs  Abdomen: soft, not tender, not distended, positive BS  Genitourinary: deferred  Rectal: deferred  Musculoskeletal: no muscle tenderness, no joint swelling or tenderness  Extremities: no pedal edema  Neurological: AxO, no focal deficits  Skin: no rashes    Labs reviewed;     Labs:                        x      x     )-----------( 91       ( 30 Jun 2021 08:05 )             x        06-30    143  |  112<H>  |  10  ----------------------------<  71  3.4<L>   |  25  |  0.38<L>    Ca    7.5<L>      30 Jun 2021 08:05  Phos  3.0     06-30  Mg     1.5     06-30             Cultures:       Culture - Blood (collected 06-28-21 @ 10:51)  Source: .Blood None  Preliminary Report (06-29-21 @ 17:02):    No growth to date.                    < from: Xray Chest 1 View-PORTABLE IMMEDIATE (Xray Chest 1 View-PORTABLE IMMEDIATE .) (06.28.21 @ 11:50) >  IMPRESSION: Infiltrates developingparticularly at left base.    < end of copied text >      Advanced directive: DNR    
  HPI: Pt is a 89y old Female with a PMHx of severe  dementia, breast CA s/p L mastectomy, HTN, OA, s/p TKR, s/p hysterectomy, s/p hernia repair, and s/p cholecystectomy presents to the ED from [facility] for eval of +vomiting today which staff reports was black in color. Daughter at bedside notes pt was screaming in pain throughout the day yesterday from +abd pain.  Pt is poor historian 2/2 baseline severe dementia: non verbal, not recognizing family members. Daughter present DNR, due to the pain I explained options and she decided GI should be consulted if procedure will alleviate pain. Pt is uncomfortable, in pain. Palliative medicine Consult to further establish GOC  6/25/21 Seen and examined at bedside with no family present. Restless and disoriented. Grimacing intermittently. Unable to answer questions.   6/28/21 Seen and examined at bedside iwht no family. Pt confused  and calling out. No non verbal signs of pain this am  PAIN: (X )Yes   ( )No  Non verbal signs  Grimacing/moaning  DYSPNEA: ( ) Yes  ( X) No    PAST MEDICAL & SURGICAL HISTORY:  HTN (Hypertension)  Dementia  Breast CA  Varicose veins  OA (osteoarthritis)  Hernia  History of Cholecystectomy  History of Hysterectomy  Shoulder Disorder  History of Left Mastectomy  S/P TKR (total knee replacement)    SOCIAL HX:  Lives in LTC  Hx opiate tolerance ( )YES  ( X)NO    Baseline ADLs  (Prior to Admission)  ( ) Independent   (X )Dependent    FAMILY HISTORY:  Unable to obtain due to dementia    Review of Systems:    Unable to obtain/Limited due to: due to dementia      PHYSICAL EXAM:  ICU Vital Signs Last 24 Hrs  T(C): 37.5 (28 Jun 2021 08:30), Max: 38.2 (27 Jun 2021 15:58)  T(F): 99.5 (28 Jun 2021 08:30), Max: 100.7 (27 Jun 2021 15:58)  HR: 93 (28 Jun 2021 09:18) (88 - 134)  BP: 142/96 (28 Jun 2021 09:18) (105/77 - 168/96)  RR: 20 (28 Jun 2021 08:30) (19 - 20)  SpO2: 95% (28 Jun 2021 08:30) (94% - 97%)      PPSV2:  10-20 %  FAST: 7C    General: Frail elderly female in bed in mild distress  Mental Status: restless/non verbal  HEENT: oral mucosa dry  Lungs: clear diminished luis felipe  Cardiac: S1S2+  GI: abd soft NT ND+ BS  : voids  Ext: GRACE on bed  Neuro: dementia      LABS:                        11.3   9.83  )-----------( 113      ( 25 Jun 2021 07:53 )             34.6     06-24    140  |  107  |  23  ----------------------------<  136<H>  4.0   |  28  |  0.69    Ca    9.1      24 Jun 2021 13:18    TPro  7.1  /  Alb  3.4  /  TBili  1.1  /  DBili  x   /  AST  32  /  ALT  29  /  AlkPhos  83  06-24      Albumin: Albumin, Serum: 3.4 g/dL (06-24 @ 13:18)      Allergies    No Known Allergies    Intolerances      MEDICATIONS  (STANDING):    MEDICATIONS  (PRN):      RADIOLOGY/ADDITIONAL STUDIES:  < from: Xray Abdomen 2 Views (06.26.21 @ 09:50) >    EXAM:  XR ABDOMEN 2V                            PROCEDURE DATE:  06/26/2021          INTERPRETATION:  KUB: AP and upright    COMPARISON: 6/24/2021 abdominal radiograph.    CLINICAL INFORMATION: SBO. Follow-up.    FINDINGS:  There is overall less air distention of the small bowel. Large bowel pattern nonspecific. No gross air-fluid levels on upright projection.  No free intra-abdominal air.  No obvious intra-abdominal masses.  Atherosclerotic calcified intimal wall plaques within nondilated abdominal aorta.  The osseous structures are intact.    IMPRESSION:    Overall less air distention of small bowel without air-fluid levels.      
Resting comfortably, awake, alert. Pulled NGT earlier    VSS afeb    Abd- + BS non distended, soft non tender    AXR- looks improved
  HPI: Pt is a 89y old Female with a PMHx of severe  dementia, breast CA s/p L mastectomy, HTN, OA, s/p TKR, s/p hysterectomy, s/p hernia repair, and s/p cholecystectomy presents to the ED from [facility] for eval of +vomiting today which staff reports was black in color. Daughter at bedside notes pt was screaming in pain throughout the day yesterday from +abd pain.  Pt is poor historian 2/2 baseline severe dementia: non verbal, not recognizing family members. Daughter present DNR, due to the pain I explained options and she decided GI should be consulted if procedure will alleviate pain. Pt is uncomfortable, in pain. Palliative medicine Consult to further establish GOC  6/25/21 Seen and examined at bedside with no family present. Restless and disoriented. Grimacing intermittently. Unable to answer questions.   6/28/21 Seen and examined at bedside iwht no family. Pt confused  and calling out. No non verbal signs of pain this am  6/29/21Seen and examined at bedside with no family. Nursing assist feeding pt who is eating small amt. Remains confused however less agitated this am  PAIN: (X )Yes   ( )No  Non verbal signs  Grimacing/moaning  DYSPNEA: ( ) Yes  ( X) No    PAST MEDICAL & SURGICAL HISTORY:  HTN (Hypertension)  Dementia  Breast CA  Varicose veins  OA (osteoarthritis)  Hernia  History of Cholecystectomy  History of Hysterectomy  Shoulder Disorder  History of Left Mastectomy  S/P TKR (total knee replacement)    SOCIAL HX:  Lives in LTC  Hx opiate tolerance ( )YES  ( X)NO    Baseline ADLs  (Prior to Admission)  ( ) Independent   (X )Dependent    FAMILY HISTORY:  Unable to obtain due to dementia    Review of Systems:    Unable to obtain/Limited due to: due to dementia      PHYSICAL EXAM:  ICU Vital Signs Last 24 Hrs  T(C): 37.2 (29 Jun 2021 09:01), Max: 37.9 (29 Jun 2021 02:09)  T(F): 99 (29 Jun 2021 09:01), Max: 100.3 (29 Jun 2021 02:09)  HR: 69 (29 Jun 2021 09:01) (69 - 98)  BP: 127/49 (29 Jun 2021 09:01) (127/49 - 132/78)  RR: 19 (29 Jun 2021 09:01) (19 - 20)  SpO2: 96% (29 Jun 2021 09:01) (96% - 97%)    PPSV2:  10-20 %  FAST: 7C    General: Frail elderly female in bed in mild distress  Mental Status: restless/non verbal  HEENT: oral mucosa dry  Lungs: clear diminished luis felipe  Cardiac: S1S2+  GI: abd soft NT ND+ BS  : voids  Ext: GRACE on bed  Neuro: dementia      LABS:                                         10.1   6.36  )-----------( 89       ( 28 Jun 2021 10:51 )             30.9      06-28    145  |  115<H>  |  8   ----------------------------<  127<H>  2.5<LL>   |  27  |  0.49<L>    Ca    7.6<L>      28 Jun 2021 10:51  Phos  1.6     06-28  Mg     1.6     06-28    TPro  7.1  /  Alb  3.4  /  TBili  1.1  /  DBili  x   /  AST  32  /  ALT  29  /  AlkPhos  83  06-24      Albumin: Albumin, Serum: 3.4 g/dL (06-24 @ 13:18)      Allergies    No Known Allergies    Intolerances      MEDICATIONS  (STANDING):    MEDICATIONS  (PRN):      RADIOLOGY/ADDITIONAL STUDIES:

## 2021-06-30 NOTE — DISCHARGE NOTE PROVIDER - NSDCMRMEDTOKEN_GEN_ALL_CORE_FT
acetaminophen 500 mg oral tablet: 2 tab(s) orally 2 times a day, As Needed  Augmentin 875 mg-125 mg oral tablet: 1 tab(s) orally every 12 hours  Decara 50,000 intl units (1250 mcg) oral capsule: 1 cap(s) orally once a week on Thurs  dronabinol 2.5 mg oral capsule: 1 cap(s) orally 2 times a day  escitalopram 10 mg oral tablet: 1 tab(s) orally once a day  QUEtiapine: 12.5 milligram(s) orally 2 times a day  Vitamin B12 1000 mcg/mL injectable solution: 1000 microgram(s) injectable once a month

## 2021-06-30 NOTE — DISCHARGE NOTE PROVIDER - HOSPITAL COURSE
Vital Signs Last 24 Hrs  T(C): 37 (30 Jun 2021 08:44), Max: 37 (30 Jun 2021 00:24)  T(F): 98.6 (30 Jun 2021 08:44), Max: 98.6 (30 Jun 2021 00:24)  HR: 72 (30 Jun 2021 08:44) (72 - 106)  BP: 116/54 (30 Jun 2021 08:44) (116/54 - 152/75)  BP(mean): --  RR: 19 (30 Jun 2021 08:44) (18 - 19)  SpO2: 95% (30 Jun 2021 08:44) (95% - 99%)    HEENT:   pupils equal and reactive, EOMI, no oropharyngeal lesions, erythema, exudates, oral thrush    NECK:   supple, no carotid bruits, no palpable lymph nodes, no thyromegaly    CV:  +S1, +S2, regular, no murmurs or rubs    RESP:   lungs clear to auscultation bilaterally, no wheezing, rales, rhonchi, good air entry bilaterally    BREAST:  not examined    GI:  abdomen soft, non-tender, non-distended, normal BS, no bruits, no abdominal masses, no palpable masses    RECTAL:  not examined    :  not examined    MSK:   normal muscle tone, no atrophy, no rigidity, no contractions    EXT:   no clubbing, no cyanosis, no edema, no calf pain, swelling or erythema    VASCULAR:  pulses equal and symmetric in the upper and lower extremities    NEURO:  AAOX3, no focal neurological deficits, follows all commands, able to move extremities spontaneously    SKIN:  no ulcers, lesions or rashes    30 Jun 2021 08:05    143    |  112    |  10     ----------------------------<  71     3.4     |  25     |  0.38     Ca    7.5        30 Jun 2021 08:05  Phos  3.0       30 Jun 2021 08:05  Mg     1.5       30 Jun 2021 08:05    CBC Full  -  ( 30 Jun 2021 08:05 )  WBC Count : x  Hemoglobin : x  Hematocrit : x  Platelet Count - Automated : 91 K/uL  Mean Cell Volume : x  Mean Cell Hemoglobin : x  Mean Cell Hemoglobin Concentration : x            Hospital Course:    89 y/o F with PMHx of severe  dementia, breast CA s/p L mastectomy, HTN, OA, s/p TKR, s/p hysterectomy, s/p hernia repair, and s/p cholecystectomy presents to the ED BIBEMS from [facility] for eval of +vomiting today. Staff reports emesis was black in color. Daughter at bedside notes pt was screaming in pain throughout the day yesterday from +abd pain. No fever. NKDA. Pt is poor historian 2/2 baseline severe dementia: non verbal, not recognizing family members. Daughter present DNR, due to the pain I explained options and she decided GI should be consulted if procedure will alleviate Mum's pain. Pt is uncomfortable, in pain.       #SBO  conservative management  s/p NGT on suction - patient pulled out the NGT  Surgery consult appreciated   cS/P IV fluids   IV PPI  repeat ABd Xray - resolving SBO  Advance to mechanical soft diet  >> tolerating well.     #Fever 2ndry to Aspiration/GNR PNA  s/p Iv zosyn >> change to oral augmentin for discharge  FU cultures  ID consult appreciated   Swallow evaluation appreciated - Mechanical soft diet   Nutriotion consult

## 2021-06-30 NOTE — PROGRESS NOTE ADULT - PROVIDER SPECIALTY LIST ADULT
Palliative Care
Surgery
Hospitalist
Palliative Care
Hospitalist
Infectious Disease
Hospitalist

## 2021-06-30 NOTE — DISCHARGE NOTE PROVIDER - NSDCCPCAREPLAN_GEN_ALL_CORE_FT
PRINCIPAL DISCHARGE DIAGNOSIS  Diagnosis: SBO (small bowel obstruction)  Assessment and Plan of Treatment:   *now resolving  *c/w soft foods as can tolerate      SECONDARY DISCHARGE DIAGNOSES  Diagnosis: Pneumonia, aspiration  Assessment and Plan of Treatment: *continue with oral augmentin for 3 more days.

## 2021-06-30 NOTE — PROGRESS NOTE ADULT - ASSESSMENT
89 y/o F with PMHx of severe  dementia, breast CA s/p L mastectomy, HTN, OA, s/p TKR, s/p hysterectomy, s/p hernia repair, and s/p cholecystectomy admitted on 6/24 from snf from [facility] for eval of +vomiting today on the day of admission. History per medical record as patient unable to provide history.   1. Patient admitted with aspiration pneumonia after vomiting, also with SBO which has resolved  - follow up cultures   - serial cbc and monitor temperature   - reviewed prior medical records to evaluate for resistant or atypical pathogens   - diet per s/s  - iv hydration and supportive care   - can continue zosyn as ordered, day #3  - tolerating antibiotics without rashes or side effects   - palliative care evaluation in progress, can stop iv antibiotics when ready for discharge; possibly with hospice  2. other issues; per medicine

## 2021-06-30 NOTE — DISCHARGE NOTE PROVIDER - DETAILS OF MALNUTRITION DIAGNOSIS/DIAGNOSES
This patient has been assessed with a concern for Malnutrition and was treated during this hospitalization for the following Nutrition diagnosis/diagnoses:     -  06/25/2021: Severe protein-calorie malnutrition

## 2021-07-03 LAB
CULTURE RESULTS: SIGNIFICANT CHANGE UP
SPECIMEN SOURCE: SIGNIFICANT CHANGE UP

## 2021-07-09 DIAGNOSIS — J15.6 PNEUMONIA DUE TO OTHER GRAM-NEGATIVE BACTERIA: ICD-10-CM

## 2021-07-09 DIAGNOSIS — J69.0 PNEUMONITIS DUE TO INHALATION OF FOOD AND VOMIT: ICD-10-CM

## 2021-07-09 DIAGNOSIS — Z74.01 BED CONFINEMENT STATUS: ICD-10-CM

## 2021-07-09 DIAGNOSIS — K56.50 INTESTINAL ADHESIONS [BANDS], UNSPECIFIED AS TO PARTIAL VERSUS COMPLETE OBSTRUCTION: ICD-10-CM

## 2021-07-09 DIAGNOSIS — E43 UNSPECIFIED SEVERE PROTEIN-CALORIE MALNUTRITION: ICD-10-CM

## 2021-07-09 DIAGNOSIS — R53.81 OTHER MALAISE: ICD-10-CM

## 2021-07-09 DIAGNOSIS — E83.39 OTHER DISORDERS OF PHOSPHORUS METABOLISM: ICD-10-CM

## 2021-07-09 DIAGNOSIS — Z96.659 PRESENCE OF UNSPECIFIED ARTIFICIAL KNEE JOINT: ICD-10-CM

## 2021-07-09 DIAGNOSIS — Z79.1 LONG TERM (CURRENT) USE OF NON-STEROIDAL ANTI-INFLAMMATORIES (NSAID): ICD-10-CM

## 2021-07-09 DIAGNOSIS — Z66 DO NOT RESUSCITATE: ICD-10-CM

## 2021-07-09 DIAGNOSIS — Z90.12 ACQUIRED ABSENCE OF LEFT BREAST AND NIPPLE: ICD-10-CM

## 2021-07-09 DIAGNOSIS — Z79.899 OTHER LONG TERM (CURRENT) DRUG THERAPY: ICD-10-CM

## 2021-07-09 DIAGNOSIS — F03.90 UNSPECIFIED DEMENTIA WITHOUT BEHAVIORAL DISTURBANCE: ICD-10-CM

## 2021-07-09 DIAGNOSIS — E87.6 HYPOKALEMIA: ICD-10-CM

## 2021-07-09 DIAGNOSIS — Z85.3 PERSONAL HISTORY OF MALIGNANT NEOPLASM OF BREAST: ICD-10-CM

## 2021-09-08 ENCOUNTER — INPATIENT (INPATIENT)
Facility: HOSPITAL | Age: 86
LOS: 0 days | Discharge: HOSPICE | DRG: 388 | End: 2021-09-09
Attending: FAMILY MEDICINE | Admitting: FAMILY MEDICINE
Payer: MEDICARE

## 2021-09-08 VITALS
WEIGHT: 149.91 LBS | RESPIRATION RATE: 18 BRPM | DIASTOLIC BLOOD PRESSURE: 66 MMHG | SYSTOLIC BLOOD PRESSURE: 134 MMHG | HEIGHT: 60 IN | HEART RATE: 76 BPM | TEMPERATURE: 97 F | OXYGEN SATURATION: 96 %

## 2021-09-08 DIAGNOSIS — Z96.659 PRESENCE OF UNSPECIFIED ARTIFICIAL KNEE JOINT: Chronic | ICD-10-CM

## 2021-09-08 DIAGNOSIS — J18.9 PNEUMONIA, UNSPECIFIED ORGANISM: ICD-10-CM

## 2021-09-08 PROBLEM — M19.90 UNSPECIFIED OSTEOARTHRITIS, UNSPECIFIED SITE: Chronic | Status: ACTIVE | Noted: 2021-06-24

## 2021-09-08 PROBLEM — C50.919 MALIGNANT NEOPLASM OF UNSPECIFIED SITE OF UNSPECIFIED FEMALE BREAST: Chronic | Status: ACTIVE | Noted: 2021-06-24

## 2021-09-08 PROBLEM — I83.90 ASYMPTOMATIC VARICOSE VEINS OF UNSPECIFIED LOWER EXTREMITY: Chronic | Status: ACTIVE | Noted: 2021-06-24

## 2021-09-08 LAB
ALBUMIN SERPL ELPH-MCNC: 3.2 G/DL — LOW (ref 3.3–5)
ALP SERPL-CCNC: 82 U/L — SIGNIFICANT CHANGE UP (ref 40–120)
ALT FLD-CCNC: 69 U/L — SIGNIFICANT CHANGE UP (ref 12–78)
ANION GAP SERPL CALC-SCNC: 10 MMOL/L — SIGNIFICANT CHANGE UP (ref 5–17)
APPEARANCE UR: CLEAR — SIGNIFICANT CHANGE UP
APTT BLD: 29.2 SEC — SIGNIFICANT CHANGE UP (ref 27.5–35.5)
AST SERPL-CCNC: 53 U/L — HIGH (ref 15–37)
BASOPHILS # BLD AUTO: 0 K/UL — SIGNIFICANT CHANGE UP (ref 0–0.2)
BASOPHILS NFR BLD AUTO: 0 % — SIGNIFICANT CHANGE UP (ref 0–2)
BILIRUB SERPL-MCNC: 1.9 MG/DL — HIGH (ref 0.2–1.2)
BILIRUB UR-MCNC: NEGATIVE — SIGNIFICANT CHANGE UP
BUN SERPL-MCNC: 26 MG/DL — HIGH (ref 7–23)
CALCIUM SERPL-MCNC: 9.1 MG/DL — SIGNIFICANT CHANGE UP (ref 8.5–10.1)
CHLORIDE SERPL-SCNC: 106 MMOL/L — SIGNIFICANT CHANGE UP (ref 96–108)
CO2 SERPL-SCNC: 26 MMOL/L — SIGNIFICANT CHANGE UP (ref 22–31)
COLOR SPEC: YELLOW — SIGNIFICANT CHANGE UP
CREAT SERPL-MCNC: 0.99 MG/DL — SIGNIFICANT CHANGE UP (ref 0.5–1.3)
DIFF PNL FLD: ABNORMAL
EOSINOPHIL # BLD AUTO: 0 K/UL — SIGNIFICANT CHANGE UP (ref 0–0.5)
EOSINOPHIL NFR BLD AUTO: 0 % — SIGNIFICANT CHANGE UP (ref 0–6)
GLUCOSE SERPL-MCNC: 153 MG/DL — HIGH (ref 70–99)
GLUCOSE UR QL: 250 MG/DL
HCT VFR BLD CALC: 41.1 % — SIGNIFICANT CHANGE UP (ref 34.5–45)
HGB BLD-MCNC: 14 G/DL — SIGNIFICANT CHANGE UP (ref 11.5–15.5)
INR BLD: 1.1 RATIO — SIGNIFICANT CHANGE UP (ref 0.88–1.16)
KETONES UR-MCNC: ABNORMAL
LACTATE SERPL-SCNC: 4 MMOL/L — CRITICAL HIGH (ref 0.7–2)
LEUKOCYTE ESTERASE UR-ACNC: ABNORMAL
LIDOCAIN IGE QN: <10 U/L — LOW (ref 73–393)
LYMPHOCYTES # BLD AUTO: 0.53 K/UL — LOW (ref 1–3.3)
LYMPHOCYTES # BLD AUTO: 3 % — LOW (ref 13–44)
MCHC RBC-ENTMCNC: 34.1 GM/DL — SIGNIFICANT CHANGE UP (ref 32–36)
MCHC RBC-ENTMCNC: 34.9 PG — HIGH (ref 27–34)
MCV RBC AUTO: 102.5 FL — HIGH (ref 80–100)
MONOCYTES # BLD AUTO: 0.53 K/UL — SIGNIFICANT CHANGE UP (ref 0–0.9)
MONOCYTES NFR BLD AUTO: 3 % — SIGNIFICANT CHANGE UP (ref 2–14)
NEUTROPHILS # BLD AUTO: 16.54 K/UL — HIGH (ref 1.8–7.4)
NEUTROPHILS NFR BLD AUTO: 82 % — HIGH (ref 43–77)
NITRITE UR-MCNC: NEGATIVE — SIGNIFICANT CHANGE UP
NRBC # BLD: SIGNIFICANT CHANGE UP /100 WBCS (ref 0–0)
PH UR: 6.5 — SIGNIFICANT CHANGE UP (ref 5–8)
PLATELET # BLD AUTO: 161 K/UL — SIGNIFICANT CHANGE UP (ref 150–400)
POTASSIUM SERPL-MCNC: 3.7 MMOL/L — SIGNIFICANT CHANGE UP (ref 3.5–5.3)
POTASSIUM SERPL-SCNC: 3.7 MMOL/L — SIGNIFICANT CHANGE UP (ref 3.5–5.3)
PROT SERPL-MCNC: 6.9 GM/DL — SIGNIFICANT CHANGE UP (ref 6–8.3)
PROT UR-MCNC: 30 MG/DL
PROTHROM AB SERPL-ACNC: 12.8 SEC — SIGNIFICANT CHANGE UP (ref 10.6–13.6)
RBC # BLD: 4.01 M/UL — SIGNIFICANT CHANGE UP (ref 3.8–5.2)
RBC # FLD: 13.2 % — SIGNIFICANT CHANGE UP (ref 10.3–14.5)
SODIUM SERPL-SCNC: 142 MMOL/L — SIGNIFICANT CHANGE UP (ref 135–145)
SP GR SPEC: 1.01 — SIGNIFICANT CHANGE UP (ref 1.01–1.02)
TROPONIN I SERPL-MCNC: 0.05 NG/ML — HIGH (ref 0.01–0.04)
TROPONIN I SERPL-MCNC: 0.06 NG/ML — HIGH (ref 0.01–0.04)
UROBILINOGEN FLD QL: 4 MG/DL
WBC # BLD: 17.78 K/UL — HIGH (ref 3.8–10.5)
WBC # FLD AUTO: 17.78 K/UL — HIGH (ref 3.8–10.5)

## 2021-09-08 PROCEDURE — 93010 ELECTROCARDIOGRAM REPORT: CPT

## 2021-09-08 PROCEDURE — 36415 COLL VENOUS BLD VENIPUNCTURE: CPT

## 2021-09-08 PROCEDURE — 99223 1ST HOSP IP/OBS HIGH 75: CPT

## 2021-09-08 PROCEDURE — 99497 ADVNCD CARE PLAN 30 MIN: CPT | Mod: 25

## 2021-09-08 PROCEDURE — 74177 CT ABD & PELVIS W/CONTRAST: CPT | Mod: 26,MA

## 2021-09-08 PROCEDURE — 84484 ASSAY OF TROPONIN QUANT: CPT

## 2021-09-08 PROCEDURE — 99285 EMERGENCY DEPT VISIT HI MDM: CPT | Mod: CS

## 2021-09-08 PROCEDURE — 71045 X-RAY EXAM CHEST 1 VIEW: CPT | Mod: 26

## 2021-09-08 RX ORDER — SODIUM CHLORIDE 9 MG/ML
1000 INJECTION INTRAMUSCULAR; INTRAVENOUS; SUBCUTANEOUS ONCE
Refills: 0 | Status: COMPLETED | OUTPATIENT
Start: 2021-09-08 | End: 2021-09-08

## 2021-09-08 RX ORDER — MORPHINE SULFATE 50 MG/1
4 CAPSULE, EXTENDED RELEASE ORAL ONCE
Refills: 0 | Status: DISCONTINUED | OUTPATIENT
Start: 2021-09-08 | End: 2021-09-08

## 2021-09-08 RX ORDER — PIPERACILLIN AND TAZOBACTAM 4; .5 G/20ML; G/20ML
3.38 INJECTION, POWDER, LYOPHILIZED, FOR SOLUTION INTRAVENOUS ONCE
Refills: 0 | Status: COMPLETED | OUTPATIENT
Start: 2021-09-08 | End: 2021-09-08

## 2021-09-08 RX ORDER — HEPARIN SODIUM 5000 [USP'U]/ML
5000 INJECTION INTRAVENOUS; SUBCUTANEOUS EVERY 12 HOURS
Refills: 0 | Status: DISCONTINUED | OUTPATIENT
Start: 2021-09-08 | End: 2021-09-08

## 2021-09-08 RX ORDER — SODIUM CHLORIDE 9 MG/ML
1000 INJECTION, SOLUTION INTRAVENOUS
Refills: 0 | Status: DISCONTINUED | OUTPATIENT
Start: 2021-09-08 | End: 2021-09-08

## 2021-09-08 RX ORDER — ACETAMINOPHEN 500 MG
650 TABLET ORAL EVERY 6 HOURS
Refills: 0 | Status: DISCONTINUED | OUTPATIENT
Start: 2021-09-08 | End: 2021-09-08

## 2021-09-08 RX ORDER — ACETAMINOPHEN 500 MG
2 TABLET ORAL
Qty: 0 | Refills: 0 | DISCHARGE

## 2021-09-08 RX ORDER — ACETAMINOPHEN 500 MG
650 TABLET ORAL ONCE
Refills: 0 | Status: COMPLETED | OUTPATIENT
Start: 2021-09-08 | End: 2021-09-08

## 2021-09-08 RX ORDER — ROBINUL 0.2 MG/ML
0.2 INJECTION INTRAMUSCULAR; INTRAVENOUS EVERY 6 HOURS
Refills: 0 | Status: DISCONTINUED | OUTPATIENT
Start: 2021-09-08 | End: 2021-09-09

## 2021-09-08 RX ORDER — ONDANSETRON 8 MG/1
4 TABLET, FILM COATED ORAL EVERY 6 HOURS
Refills: 0 | Status: DISCONTINUED | OUTPATIENT
Start: 2021-09-08 | End: 2021-09-09

## 2021-09-08 RX ORDER — DRONABINOL 2.5 MG
2.5 CAPSULE ORAL
Refills: 0 | Status: DISCONTINUED | OUTPATIENT
Start: 2021-09-08 | End: 2021-09-09

## 2021-09-08 RX ORDER — PIPERACILLIN AND TAZOBACTAM 4; .5 G/20ML; G/20ML
3.38 INJECTION, POWDER, LYOPHILIZED, FOR SOLUTION INTRAVENOUS EVERY 8 HOURS
Refills: 0 | Status: DISCONTINUED | OUTPATIENT
Start: 2021-09-08 | End: 2021-09-08

## 2021-09-08 RX ORDER — ESCITALOPRAM OXALATE 10 MG/1
10 TABLET, FILM COATED ORAL DAILY
Refills: 0 | Status: DISCONTINUED | OUTPATIENT
Start: 2021-09-08 | End: 2021-09-09

## 2021-09-08 RX ORDER — MORPHINE SULFATE 50 MG/1
2 CAPSULE, EXTENDED RELEASE ORAL
Refills: 0 | Status: DISCONTINUED | OUTPATIENT
Start: 2021-09-08 | End: 2021-09-09

## 2021-09-08 RX ADMIN — SODIUM CHLORIDE 1000 MILLILITER(S): 9 INJECTION INTRAMUSCULAR; INTRAVENOUS; SUBCUTANEOUS at 18:02

## 2021-09-08 RX ADMIN — MORPHINE SULFATE 4 MILLIGRAM(S): 50 CAPSULE, EXTENDED RELEASE ORAL at 18:40

## 2021-09-08 RX ADMIN — PIPERACILLIN AND TAZOBACTAM 200 GRAM(S): 4; .5 INJECTION, POWDER, LYOPHILIZED, FOR SOLUTION INTRAVENOUS at 15:36

## 2021-09-08 RX ADMIN — Medication 650 MILLIGRAM(S): at 15:15

## 2021-09-08 RX ADMIN — SODIUM CHLORIDE 1000 MILLILITER(S): 9 INJECTION INTRAMUSCULAR; INTRAVENOUS; SUBCUTANEOUS at 15:35

## 2021-09-08 RX ADMIN — MORPHINE SULFATE 2 MILLIGRAM(S): 50 CAPSULE, EXTENDED RELEASE ORAL at 23:32

## 2021-09-08 RX ADMIN — SODIUM CHLORIDE 1000 MILLILITER(S): 9 INJECTION INTRAMUSCULAR; INTRAVENOUS; SUBCUTANEOUS at 17:55

## 2021-09-08 RX ADMIN — Medication 650 MILLIGRAM(S): at 14:35

## 2021-09-08 RX ADMIN — Medication 0.5 MILLIGRAM(S): at 23:32

## 2021-09-08 RX ADMIN — MORPHINE SULFATE 4 MILLIGRAM(S): 50 CAPSULE, EXTENDED RELEASE ORAL at 19:00

## 2021-09-08 RX ADMIN — PIPERACILLIN AND TAZOBACTAM 3.38 GRAM(S): 4; .5 INJECTION, POWDER, LYOPHILIZED, FOR SOLUTION INTRAVENOUS at 16:39

## 2021-09-08 NOTE — ED PROVIDER NOTE - PHYSICAL EXAMINATION
*GEN: No acute distress, well appearing   *HEAD: Normocephalic, Atraumatic  *EYES/NOSE: b/l Pupils symmetric & Reactive to ligth, EOMI b/l  *THROAT: airway patent, moist mucous membranes  *NECK: Neck supple  *PULMONARY: No Respiratory distress, symmetric b/l chest rise  *CARDIAC: s1s2, regular rhythm   *ABDOMEN:  Non Tender, Non Distended, soft, no guarding, no rebound, no masses . (+) reducible hernia   *BACK: no CVA tenderness, No midline vertebral tenderness to palpation   *EXTREMITIES: symmetric pulses, 2+ DP & radial pulses, no cyanosis, no edema   *SKIN: no rash, no bruising   *NEUROLOGIC: alert,  full active & passive ROM in all 4 extremities,   *PSYCH: severely demented, groans whenever pt is touched

## 2021-09-08 NOTE — ED ADULT NURSE NOTE - OBJECTIVE STATEMENT
Pt BIBEMS from [facility], as per EMS "pt was sent for r/o SBO." pt had multiple bowel movements today. pt awake and uncooperative, hx of dementia a/ox0. +abdominal hernia present. pt appears in no pain/acute distress.

## 2021-09-08 NOTE — ED PROVIDER NOTE - NSICDXPASTMEDICALHX_GEN_ALL_CORE_FT
PAST MEDICAL HISTORY:  Breast CA     Dementia     HTN (Hypertension)     OA (osteoarthritis)     Varicose veins

## 2021-09-08 NOTE — ED ADULT NURSE NOTE - NSIMPLEMENTINTERV_GEN_ALL_ED
Implemented All Fall with Harm Risk Interventions:  [place] to call system. Call bell, personal items and telephone within reach. Instruct patient to call for assistance. Room bathroom lighting operational. Non-slip footwear when patient is off stretcher. Physically safe environment: no spills, clutter or unnecessary equipment. Stretcher in lowest position, wheels locked, appropriate side rails in place. Provide visual cue, wrist band, yellow gown, etc. Monitor gait and stability. Monitor for mental status changes and reorient to person, place, and time. Review medications for side effects contributing to fall risk. Reinforce activity limits and safety measures with patient and family. Provide visual clues: red socks.

## 2021-09-08 NOTE — ED PROVIDER NOTE - PROGRESS NOTE DETAILS
Carlos Reich: Signed out to Dr Villanueva Carlos Villanueva: pt signed out  to me, care assumed from Dr. Reich. Pt here with abd pain here for SBO. Admission pending, CT abd pelvis. X ray elbow.

## 2021-09-08 NOTE — ED PROVIDER NOTE - CLINICAL SUMMARY MEDICAL DECISION MAKING FREE TEXT BOX
Will evaluate for bowel obstruction. Will evaluate for bowel obstruction. Has temp of 100.3, will check UA and consider admission.

## 2021-09-08 NOTE — H&P ADULT - HISTORY OF PRESENT ILLNESS
89 y.o. female with PMHx of Breast CA, advanced dementia, previous hospitalization for SBO treated with NGT placement now p/w with abdominalpain associated with N/V since yesterday evening. As per daughter at bedside pt has been deteriorating over the course of last year with poor po intake, weight loss, worsening confusion.

## 2021-09-08 NOTE — ED PROVIDER NOTE - NSICDXPASTSURGICALHX_GEN_ALL_CORE_FT
PAST SURGICAL HISTORY:  Hernia     History of Cholecystectomy     History of Hysterectomy     History of Left Mastectomy     S/P TKR (total knee replacement)     Shoulder Disorder

## 2021-09-08 NOTE — H&P ADULT - ASSESSMENT
89 y.o. female with advanced demetia with FTT and recurrent SBO - discussed with daughter at bedside: would like to proceed with comfrot care only and transition to hospice if stable   - IV morphine, ativan, Robinul as needed   - comfort care only   - stopped all interventions   - MOLST filled out

## 2021-09-08 NOTE — ED PROVIDER NOTE - OBJECTIVE STATEMENT
Pertinent HPI/PMH/PSH/FHx/SHx and Review of Systems contained within  HPI:   bib EMS to r/o bowel obstruction since pt appears to be in pain. Pt is having BMs. In June 2021, pt had a SBO managed conservatively by NG tube   PMH/PSH relevant for:  severe  dementia, breast CA s/p L mastectomy, HTN, OA, s/p TKR, s/p hysterectomy, s/p hernia repair, and s/p cholecystectomy  As per family ROS negative for: fever, SOB, vomiting, diarrhea, changes in urine, changes in behavior, changes in appetite  FamilyHx and SocialHx not otherwise contributory Pertinent HPI/PMH/PSH/FHx/SHx and Review of Systems contained within  HPI:   89F bib EMS to r/o bowel obstruction since pt appears to be in pain. Pt is having BMs. In June 2021, pt had a SBO managed conservatively by NG tube   PMH/PSH relevant for:  severe  dementia, breast CA s/p L mastectomy, HTN, OA, s/p TKR, s/p hysterectomy, s/p hernia repair, and s/p cholecystectomy  As per family ROS negative for: fever, SOB, vomiting, diarrhea, changes in urine, changes in behavior, changes in appetite  FamilyHx and SocialHx not otherwise contributory

## 2021-09-08 NOTE — H&P ADULT - NSHPPHYSICALEXAM_GEN_ALL_CORE
PHYSICAL EXAM:    General: frailelderly female in acute distress with exam  Eyes: conjunctiva and sclera clear  Head: Normocephalic; atraumatic, bitemporal wasting  ENMT: No nasal discharge; dry mucosals  Neck: Supple; non tender; no masses  Respiratory: decreased BS at bases  Cardiovascular: S1, S2 reg with III/VI MO  Gastrointestinal: distended abdomen tender with exam with hyperresonant BS  Genitourinary: No costovertebral angle tenderness  Extremities:  No clubbing, cyanosis or edema  Vascular: Peripheral pulses palpable 2+ bilaterally  Neurological: confused  Skin: Warm and dry.   Musculoskeletal: atrophic muscles  Psychiatric: non-verbal, tearful with any exam

## 2021-09-08 NOTE — ED PROVIDER NOTE - NS ED ROS FT
Review of Systems:  	•	CONSTITUTIONAL: no fever                    •	HEENT: no cough, no ear pulling  	•	SKIN: no rash  	•	RESPIRATORY: no shortness of breath  	•	GI:  no vomiting, no diarrhea  	•	GENITO-URINARY:   no hematuria, no changes in urine  	•	NEUROLOGIC: no changes in behavior  	•	ALLERGY: no rhinitis

## 2021-09-09 ENCOUNTER — TRANSCRIPTION ENCOUNTER (OUTPATIENT)
Age: 86
End: 2021-09-09

## 2021-09-09 VITALS
OXYGEN SATURATION: 98 % | RESPIRATION RATE: 18 BRPM | HEART RATE: 69 BPM | SYSTOLIC BLOOD PRESSURE: 157 MMHG | TEMPERATURE: 98 F | DIASTOLIC BLOOD PRESSURE: 100 MMHG

## 2021-09-09 LAB
CULTURE RESULTS: SIGNIFICANT CHANGE UP
SPECIMEN SOURCE: SIGNIFICANT CHANGE UP

## 2021-09-09 PROCEDURE — 99239 HOSP IP/OBS DSCHRG MGMT >30: CPT

## 2021-09-09 RX ORDER — CHOLECALCIFEROL (VITAMIN D3) 125 MCG
1 CAPSULE ORAL
Qty: 0 | Refills: 0 | DISCHARGE

## 2021-09-09 RX ORDER — MORPHINE SULFATE 50 MG/1
2 CAPSULE, EXTENDED RELEASE ORAL
Qty: 0 | Refills: 0 | DISCHARGE
Start: 2021-09-09

## 2021-09-09 RX ORDER — ROBINUL 0.2 MG/ML
0.2 INJECTION INTRAMUSCULAR; INTRAVENOUS
Qty: 0 | Refills: 0 | DISCHARGE
Start: 2021-09-09

## 2021-09-09 RX ORDER — ACETAMINOPHEN 500 MG
2 TABLET ORAL
Qty: 0 | Refills: 0 | DISCHARGE

## 2021-09-09 RX ORDER — ESCITALOPRAM OXALATE 10 MG/1
1 TABLET, FILM COATED ORAL
Qty: 0 | Refills: 0 | DISCHARGE

## 2021-09-09 RX ORDER — DRONABINOL 2.5 MG
1 CAPSULE ORAL
Qty: 0 | Refills: 0 | DISCHARGE

## 2021-09-09 RX ORDER — PREGABALIN 225 MG/1
1000 CAPSULE ORAL
Qty: 0 | Refills: 0 | DISCHARGE

## 2021-09-09 RX ADMIN — MORPHINE SULFATE 2 MILLIGRAM(S): 50 CAPSULE, EXTENDED RELEASE ORAL at 03:10

## 2021-09-09 RX ADMIN — MORPHINE SULFATE 2 MILLIGRAM(S): 50 CAPSULE, EXTENDED RELEASE ORAL at 00:00

## 2021-09-09 NOTE — DISCHARGE NOTE PROVIDER - NSDCCPCAREPLAN_GEN_ALL_CORE_FT
PRINCIPAL DISCHARGE DIAGNOSIS  Diagnosis: Pneumonia  Assessment and Plan of Treatment: transfer to inpatient hospice      SECONDARY DISCHARGE DIAGNOSES  Diagnosis: SBO (small bowel obstruction)  Assessment and Plan of Treatment:

## 2021-09-09 NOTE — PROGRESS NOTE ADULT - ASSESSMENT
89 y.o. female with advanced demetia with FTT and recurrent SBO - discussed with daughter at bedside: would like to proceed with comfrot care only and transition to hospice if stable   - IV morphine, ativan, Robinul as needed   - comfort care only   - stopped all interventions   - BOWEN filled out    Dispo awaiting inpatient hospice transfer; D/C done

## 2021-09-09 NOTE — HOSPICE CARE NOTE - CONVESATION DETAILS
Received referral for inpatient hospice. Patient approved to go to the hospice [facility]. This writer spoke with  pts daughter Katherin. Discussed inpatient hospice. Pt to be transferred to the hospice [facility] at 9pm.

## 2021-09-09 NOTE — DISCHARGE NOTE NURSING/CASE MANAGEMENT/SOCIAL WORK - PATIENT PORTAL LINK FT
You can access the FollowMyHealth Patient Portal offered by [facility] by registering at the following website: [email or web address]. By joining [facility]’s FollowMyHealth portal, you will also be able to view your health information using other applications (apps) compatible with our system.

## 2021-09-09 NOTE — DISCHARGE NOTE PROVIDER - DETAILS OF MALNUTRITION DIAGNOSIS/DIAGNOSES
This patient has been assessed with a concern for Malnutrition and was treated during this hospitalization for the following Nutrition diagnosis/diagnoses:     -  09/09/2021: Severe protein-calorie malnutrition

## 2021-09-09 NOTE — DIETITIAN INITIAL EVALUATION ADULT. - PATIENT PROFILE REVIEWED
[email or web address]
yes

## 2021-09-09 NOTE — DIETITIAN INITIAL EVALUATION ADULT. - PERTINENT LABORATORY DATA
09-08    142  |  106  |  26<H>  ----------------------------<  153<H>  3.7   |  26  |  0.99    Ca    9.1      08 Sep 2021 14:34    TPro  6.9  /  Alb  3.2<L>  /  TBili  1.9<H>  /  DBili  x   /  AST  53<H>  /  ALT  69  /  AlkPhos  82  09-08

## 2021-09-09 NOTE — PROGRESS NOTE ADULT - NUTRITIONAL ASSESSMENT
This patient has been assessed with a concern for Malnutrition and has been determined to have a diagnosis/diagnoses of Severe protein-calorie malnutrition.    This patient is being managed with:   Diet NPO-  Except Medications  Entered: Sep  8 2021  8:09PM

## 2021-09-09 NOTE — DISCHARGE NOTE PROVIDER - HOSPITAL COURSE
HOSPITALIST PROGRESS NOTE:  SUBJECTIVE:  PCP:  Chief Complaint: Patient is a 89y old  Female who presents with a chief complaint of abdominal pain associated with N/V since last night (09 Sep 2021 10:43)      HPI:  89 y.o. female with PMHx of Breast CA, advanced dementia, previous hospitalization for SBO treated with NGT placement now p/w with abdominalpain associated with N/V since yesterday evening. As per daughter at bedside pt has been deteriorating over the course of last year with poor po intake, weight loss, worsening confusion. (08 Sep 2021 21:01)    9/9:  Patient unresponisve; sleeping; Discussed with patients daughter who is agreement in inpatient hospice     89 y.o. female with advanced demetia with FTT and recurrent SBO - discussed with daughter at bedside: would like to proceed with comfrot care only and transition to hospice if stable   - IV morphine, ativan, Robinul as needed   - comfort care only   - stopped all interventions   - MOLST filled out    Dispo awaiting inpatient hospice transfer; D/C done

## 2021-09-09 NOTE — DIETITIAN INITIAL EVALUATION ADULT. - MALNUTRITION
severe malnutrition in chronic illness r/t decreased ability to consume sufficient energy 2/2 dementia on GI illnesses AEB severe muscle/fat wasting severe malnutrition in chronic illness

## 2021-09-09 NOTE — DIETITIAN INITIAL EVALUATION ADULT. - OTHER INFO
89yo female with PMH significant for breast CA, advanced dementia, previous hospitalizations for SBO p/w abd pain with N/V x 1 day; per family, pt deteriorating over the last year with poor PO intake, weight loss, and worsening confusion.  Pt admitted with FTT, recurrent SBO; pt comfort care only; transition to hospice.

## 2021-09-09 NOTE — DISCHARGE NOTE PROVIDER - NSDCMRMEDTOKEN_GEN_ALL_CORE_FT
glycopyrrolate 0.2 mg/mL injectable solution: 0.2 milligram(s) intravenous every 6 hours, As Needed  LORazepam: 0.5 milligram(s) intravenous every 4 hours, As Needed agitation  morphine: 2 milligram(s) intravenous every 2 hours, As Needed

## 2021-09-09 NOTE — DIETITIAN INITIAL EVALUATION ADULT. - PERTINENT MEDS FT
MEDICATIONS  (STANDING):  dronabinol 2.5 milliGRAM(s) Oral two times a day  escitalopram 10 milliGRAM(s) Oral daily    MEDICATIONS  (PRN):  glycopyrrolate Injectable 0.2 milliGRAM(s) IV Push every 6 hours PRN secretions  LORazepam   Injectable 0.5 milliGRAM(s) IV Push every 4 hours PRN Agitation  morphine  - Injectable 2 milliGRAM(s) IV Push every 2 hours PRN Severe Pain (7 - 10)/SOB  ondansetron Injectable 4 milliGRAM(s) IV Push every 6 hours PRN Nausea and/or Vomiting

## 2021-09-09 NOTE — PROGRESS NOTE ADULT - SUBJECTIVE AND OBJECTIVE BOX
HOSPITALIST PROGRESS NOTE:  SUBJECTIVE:  PCP:  Chief Complaint: Patient is a 89y old  Female who presents with a chief complaint of abdominal pain associated with N/V since last night (09 Sep 2021 10:43)      HPI:  89 y.o. female with PMHx of Breast CA, advanced dementia, previous hospitalization for SBO treated with NGT placement now p/w with abdominalpain associated with N/V since yesterday evening. As per daughter at bedside pt has been deteriorating over the course of last year with poor po intake, weight loss, worsening confusion. (08 Sep 2021 21:01)    :  Patient unresponisve; sleeping; Discussed with patients daughter       Allergies:  No Known Allergies    REVIEW OF SYSTEMS:  See HPI. All other review of systems is negative unless indicated above.     OBJECTIVE  Physical Exam:  Vital Signs:  Height (cm): 152.4 ( @ 13:55)  Weight (kg): 68 ( @ 13:55)  BMI (kg/m2): 29.3 ( @ 13:55)  BSA (m2): 1.65 ( @ 13:55)  Vital Signs Last 24 Hrs  T(C): 36.8 (08 Sep 2021 18:10), Max: 37.9 (08 Sep 2021 14:13)  T(F): 98.2 (08 Sep 2021 18:10), Max: 100.3 (08 Sep 2021 14:13)  HR: 75 (08 Sep 2021 21:55) (75 - 95)  BP: 103/54 (08 Sep 2021 21:55) (103/54 - 157/100)  BP(mean): 117 (08 Sep 2021 18:10) (72 - 117)  RR: 20 (08 Sep 2021 21:55) (18 - 20)  SpO2: 98% (08 Sep 2021 21:55) (95% - 98%)  I&O's Summary      Constitutional: NAD,   Neurological: no focal deficits  HEENT: PERRLA, EOMI, MMM  Neck: Soft and supple, No LAD, No JVD  Respiratory: Breath sounds are clear bilaterally, No wheezing, rales + rhonchi  Cardiovascular: S1 and S2, regular rate and rhythm; no Murmurs, gallops or rubs  Gastrointestinal: Bowel Sounds present, soft, nontender, nondistended, no guarding, no rebound tenderness  Back: No CVA tenderness   Extremities: No peripheral edema  Vascular: 2+ peripheral pulses  Musculoskeletal: unable to assess   Skin: No rashes  Breast: Deferred  Rectal: Deferred    MEDICATIONS  (STANDING):  dronabinol 2.5 milliGRAM(s) Oral two times a day  escitalopram 10 milliGRAM(s) Oral daily      LABS: All Labs Reviewed:                        14.0   17.78 )-----------( 161      ( 08 Sep 2021 14:34 )             41.1         142  |  106  |  26<H>  ----------------------------<  153<H>  3.7   |  26  |  0.99    Ca    9.1      08 Sep 2021 14:34    TPro  6.9  /  Alb  3.2<L>  /  TBili  1.9<H>  /  DBili  x   /  AST  53<H>  /  ALT  69  /  AlkPhos  82      PT/INR - ( 08 Sep 2021 14:34 )   PT: 12.8 sec;   INR: 1.10 ratio         PTT - ( 08 Sep 2021 14:34 )  PTT:29.2 sec  CARDIAC MARKERS ( 08 Sep 2021 20:51 )  0.055 ng/mL / x     / x     / x     / x      CARDIAC MARKERS ( 08 Sep 2021 14:34 )  0.051 ng/mL / x     / x     / x     / x            Urinalysis Basic - ( 08 Sep 2021 14:34 )    Color: Yellow / Appearance: Clear / S.015 / pH: x  Gluc: x / Ketone: Trace  / Bili: Negative / Urobili: 4 mg/dL   Blood: x / Protein: 30 mg/dL / Nitrite: Negative   Leuk Esterase: Trace / RBC: 0-2 /HPF / WBC 3-5   Sq Epi: x / Non Sq Epi: Few / Bacteria: Occasional      RADIOLOGY/EKG:    < from: Xray Chest 1 View- PORTABLE-Urgent (21 @ 15:04) >    IMPRESSION: Left lower lobe infiltrate largely clear.  Amorphous small infiltrate right lateral lung.    < end of copied text >  < from: CT Abdomen and Pelvis w/ IV Cont (21 @ 15:49) >    IMPRESSION:  Right lower lobe dependent consolidation concerning for pneumonia.    Small bowel obstruction with transition in the region of a ventral hernia, likely due to an adhesion.    < end of copied text >

## 2021-09-09 NOTE — PROGRESS NOTE ADULT - REASON FOR ADMISSION
abdominal pain associated with N/V since last night O-T Plasty Text: The defect edges were debeveled with a #15 scalpel blade.  Given the location of the defect, shape of the defect and the proximity to free margins an O-T plasty was deemed most appropriate.  Using a sterile surgical marker, an appropriate O-T plasty was drawn incorporating the defect and placing the expected incisions within the relaxed skin tension lines where possible.    The area thus outlined was incised deep to adipose tissue with a #15 scalpel blade.  The skin margins were undermined to an appropriate distance in all directions utilizing iris scissors.

## 2021-09-10 LAB
COVID-19 SPIKE DOMAIN AB INTERP: POSITIVE
COVID-19 SPIKE DOMAIN ANTIBODY RESULT: 24.2 U/ML — HIGH
SARS-COV-2 IGG+IGM SERPL QL IA: 24.2 U/ML — HIGH
SARS-COV-2 IGG+IGM SERPL QL IA: POSITIVE

## 2021-09-13 LAB
CULTURE RESULTS: SIGNIFICANT CHANGE UP
SPECIMEN SOURCE: SIGNIFICANT CHANGE UP

## 2021-09-15 DIAGNOSIS — Z90.710 ACQUIRED ABSENCE OF BOTH CERVIX AND UTERUS: ICD-10-CM

## 2021-09-15 DIAGNOSIS — M19.90 UNSPECIFIED OSTEOARTHRITIS, UNSPECIFIED SITE: ICD-10-CM

## 2021-09-15 DIAGNOSIS — K56.609 UNSPECIFIED INTESTINAL OBSTRUCTION, UNSPECIFIED AS TO PARTIAL VERSUS COMPLETE OBSTRUCTION: ICD-10-CM

## 2021-09-15 DIAGNOSIS — Z96.659 PRESENCE OF UNSPECIFIED ARTIFICIAL KNEE JOINT: ICD-10-CM

## 2021-09-15 DIAGNOSIS — F03.90 UNSPECIFIED DEMENTIA, UNSPECIFIED SEVERITY, WITHOUT BEHAVIORAL DISTURBANCE, PSYCHOTIC DISTURBANCE, MOOD DISTURBANCE, AND ANXIETY: ICD-10-CM

## 2021-09-15 DIAGNOSIS — Z66 DO NOT RESUSCITATE: ICD-10-CM

## 2021-09-15 DIAGNOSIS — Z74.01 BED CONFINEMENT STATUS: ICD-10-CM

## 2021-09-15 DIAGNOSIS — Z90.12 ACQUIRED ABSENCE OF LEFT BREAST AND NIPPLE: ICD-10-CM

## 2021-09-15 DIAGNOSIS — J18.9 PNEUMONIA, UNSPECIFIED ORGANISM: ICD-10-CM

## 2021-09-15 DIAGNOSIS — I10 ESSENTIAL (PRIMARY) HYPERTENSION: ICD-10-CM

## 2021-09-15 DIAGNOSIS — I83.90 ASYMPTOMATIC VARICOSE VEINS OF UNSPECIFIED LOWER EXTREMITY: ICD-10-CM

## 2021-09-15 DIAGNOSIS — Z51.5 ENCOUNTER FOR PALLIATIVE CARE: ICD-10-CM

## 2021-09-15 DIAGNOSIS — Z85.3 PERSONAL HISTORY OF MALIGNANT NEOPLASM OF BREAST: ICD-10-CM

## 2021-09-15 DIAGNOSIS — Z90.49 ACQUIRED ABSENCE OF OTHER SPECIFIED PARTS OF DIGESTIVE TRACT: ICD-10-CM

## 2021-09-15 DIAGNOSIS — E43 UNSPECIFIED SEVERE PROTEIN-CALORIE MALNUTRITION: ICD-10-CM

## 2021-09-15 DIAGNOSIS — R62.7 ADULT FAILURE TO THRIVE: ICD-10-CM

## 2022-01-01 NOTE — ED ADULT TRIAGE NOTE - CHIEF COMPLAINT QUOTE
3 after which time elective  Pt has very tender right abdominal hernia, possible incarcerated hernia, pt has seen Dr. morales and Dr. rosas , states she is not a surgical canidate . Pt had hernia repair with mesh . Pt is yelling in triage of severe pain x 1 hr PTA . hx dementia Denise Kelley MD  , Pediatric Dermatology    Copy: [facility]  [address]  [facility] [address]

## 2022-10-27 NOTE — ED ADULT NURSE NOTE - NSFALLRSKPASTHIST_ED_ALL_ED
no Estlander Flap (Upper To Lower Lip) Text: The defect of the lower lip was assessed and measured.  Given the location and size of the defect, an Estlander flap was deemed most appropriate.  Using a sterile surgical marker, an appropriate Estlander flap was measured and drawn on the upper lip. Local anesthesia was then infiltrated. A scalpel was then used to incise the lateral aspect of the flap, through skin, muscle and mucosa, leaving the flap pedicled medially.  The flap was then rotated and positioned to fill the lower lip defect.  The flap was then sutured into place with a three layer technique, closing the orbicularis oris muscle layer with subcutaneous buried sutures, followed by a mucosal layer and an epidermal layer.

## 2023-03-17 NOTE — ED PROVIDER NOTE - CPE EDP NEURO NORM
Nursing documentation reviewed. I concur and confirm discussing this plan of care.    [facility]'s [facility] before filling prescriptions today.      Griffin Felix MD  3/17/2023  12:56 PM     normal...

## 2023-03-30 NOTE — PHYSICAL THERAPY INITIAL EVALUATION ADULT - MANUAL MUSCLE TESTING RESULTS, REHAB EVAL
pt unable to follow commands/not tested due to Area H Indication Text: Tumors in this location are included in Area H (eyelids, eyebrows, nose, lips, chin, ear, pre-auricular, post-auricular, temple, genitalia, hands, feet, ankles and areola).  Tissue conservation is critical in these anatomic locations.

## 2024-02-06 NOTE — PATIENT PROFILE ADULT - NURSING HOMES
Quality 226: Preventive Care And Screening: Tobacco Use: Screening And Cessation Intervention: Patient screened for tobacco use and is an ex/non-smoker
Detail Level: Detailed
[facility]

## 2025-02-03 NOTE — DISCHARGE NOTE NURSING/CASE MANAGEMENT/SOCIAL WORK - NSDCPEFALRISK_GEN_ALL_CORE
Patient information on fall and injury prevention
Patient information on fall and injury prevention
[No Acute Distress] : no acute distress
[Well Nourished] : well nourished
[Well Developed] : well developed
[Well-Appearing] : well-appearing
[Normal Sclera/Conjunctiva] : normal sclera/conjunctiva
[PERRL] : pupils equal round and reactive to light
[EOMI] : extraocular movements intact
[Normal Outer Ear/Nose] : the outer ears and nose were normal in appearance
[Normal Oropharynx] : the oropharynx was normal
[No JVD] : no jugular venous distention
[No Lymphadenopathy] : no lymphadenopathy
[Supple] : supple
[Thyroid Normal, No Nodules] : the thyroid was normal and there were no nodules present
[No Respiratory Distress] : no respiratory distress 
[No Accessory Muscle Use] : no accessory muscle use
[Clear to Auscultation] : lungs were clear to auscultation bilaterally
[Normal Rate] : normal rate 
[Regular Rhythm] : with a regular rhythm
[Normal S1, S2] : normal S1 and S2
[No Murmur] : no murmur heard
[No Carotid Bruits] : no carotid bruits
[No Abdominal Bruit] : a ~M bruit was not heard ~T in the abdomen
[No Varicosities] : no varicosities
[Pedal Pulses Present] : the pedal pulses are present
[No Edema] : there was no peripheral edema
[No Palpable Aorta] : no palpable aorta
[No Extremity Clubbing/Cyanosis] : no extremity clubbing/cyanosis
[Soft] : abdomen soft
[Non Tender] : non-tender
[Non-distended] : non-distended
[No Masses] : no abdominal mass palpated
[No HSM] : no HSM
[Normal Bowel Sounds] : normal bowel sounds
[Normal Posterior Cervical Nodes] : no posterior cervical lymphadenopathy
[Normal Anterior Cervical Nodes] : no anterior cervical lymphadenopathy
[No CVA Tenderness] : no CVA  tenderness
[No Spinal Tenderness] : no spinal tenderness
[No Joint Swelling] : no joint swelling
[Grossly Normal Strength/Tone] : grossly normal strength/tone
[No Rash] : no rash
[Coordination Grossly Intact] : coordination grossly intact
[No Focal Deficits] : no focal deficits
[Normal Gait] : normal gait
[Deep Tendon Reflexes (DTR)] : deep tendon reflexes were 2+ and symmetric
[Normal Affect] : the affect was normal
[Normal Insight/Judgement] : insight and judgment were intact